# Patient Record
Sex: FEMALE | Race: WHITE | NOT HISPANIC OR LATINO | Employment: UNEMPLOYED | URBAN - METROPOLITAN AREA
[De-identification: names, ages, dates, MRNs, and addresses within clinical notes are randomized per-mention and may not be internally consistent; named-entity substitution may affect disease eponyms.]

---
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
## 2020-02-10 ENCOUNTER — HOSPITAL ENCOUNTER (EMERGENCY)
Facility: MEDICAL CENTER | Age: 20
End: 2020-02-10
Attending: EMERGENCY MEDICINE

## 2020-02-10 ENCOUNTER — APPOINTMENT (OUTPATIENT)
Dept: RADIOLOGY | Facility: MEDICAL CENTER | Age: 20
End: 2020-02-10
Attending: EMERGENCY MEDICINE

## 2020-02-10 VITALS
RESPIRATION RATE: 16 BRPM | HEIGHT: 63 IN | SYSTOLIC BLOOD PRESSURE: 104 MMHG | WEIGHT: 100.31 LBS | TEMPERATURE: 97.7 F | DIASTOLIC BLOOD PRESSURE: 72 MMHG | BODY MASS INDEX: 17.77 KG/M2 | HEART RATE: 76 BPM | OXYGEN SATURATION: 99 %

## 2020-02-10 DIAGNOSIS — N39.0 URINARY TRACT INFECTION WITH HEMATURIA, SITE UNSPECIFIED: ICD-10-CM

## 2020-02-10 DIAGNOSIS — R31.9 URINARY TRACT INFECTION WITH HEMATURIA, SITE UNSPECIFIED: ICD-10-CM

## 2020-02-10 DIAGNOSIS — O20.0 ABORTION, THREATENED: ICD-10-CM

## 2020-02-10 LAB
ALBUMIN SERPL BCP-MCNC: 5.1 G/DL (ref 3.2–4.9)
ALBUMIN/GLOB SERPL: 2.3 G/DL
ALP SERPL-CCNC: 64 U/L (ref 30–99)
ALT SERPL-CCNC: 8 U/L (ref 2–50)
ANION GAP SERPL CALC-SCNC: 10 MMOL/L (ref 0–11.9)
APPEARANCE UR: CLEAR
AST SERPL-CCNC: 15 U/L (ref 12–45)
B-HCG SERPL-ACNC: 87.6 MIU/ML (ref 0–5)
BACTERIA #/AREA URNS HPF: ABNORMAL /HPF
BASOPHILS # BLD AUTO: 0.6 % (ref 0–1.8)
BASOPHILS # BLD: 0.05 K/UL (ref 0–0.12)
BILIRUB SERPL-MCNC: 1 MG/DL (ref 0.1–1.5)
BILIRUB UR QL STRIP.AUTO: NEGATIVE
BUN SERPL-MCNC: 12 MG/DL (ref 8–22)
CALCIUM SERPL-MCNC: 9.8 MG/DL (ref 8.5–10.5)
CHLORIDE SERPL-SCNC: 105 MMOL/L (ref 96–112)
CO2 SERPL-SCNC: 23 MMOL/L (ref 20–33)
COLOR UR: YELLOW
CREAT SERPL-MCNC: 0.79 MG/DL (ref 0.5–1.4)
EOSINOPHIL # BLD AUTO: 0.09 K/UL (ref 0–0.51)
EOSINOPHIL NFR BLD: 1.1 % (ref 0–6.9)
EPI CELLS #/AREA URNS HPF: NEGATIVE /HPF
ERYTHROCYTE [DISTWIDTH] IN BLOOD BY AUTOMATED COUNT: 41.1 FL (ref 35.9–50)
GLOBULIN SER CALC-MCNC: 2.2 G/DL (ref 1.9–3.5)
GLUCOSE SERPL-MCNC: 80 MG/DL (ref 65–99)
GLUCOSE UR STRIP.AUTO-MCNC: NEGATIVE MG/DL
HCT VFR BLD AUTO: 40.2 % (ref 37–47)
HGB BLD-MCNC: 13.2 G/DL (ref 12–16)
HYALINE CASTS #/AREA URNS LPF: ABNORMAL /LPF
IMM GRANULOCYTES # BLD AUTO: 0.03 K/UL (ref 0–0.11)
IMM GRANULOCYTES NFR BLD AUTO: 0.4 % (ref 0–0.9)
KETONES UR STRIP.AUTO-MCNC: ABNORMAL MG/DL
LEUKOCYTE ESTERASE UR QL STRIP.AUTO: NEGATIVE
LIPASE SERPL-CCNC: 11 U/L (ref 11–82)
LYMPHOCYTES # BLD AUTO: 2.49 K/UL (ref 1–4.8)
LYMPHOCYTES NFR BLD: 29.5 % (ref 22–41)
MCH RBC QN AUTO: 29.5 PG (ref 27–33)
MCHC RBC AUTO-ENTMCNC: 32.8 G/DL (ref 33.6–35)
MCV RBC AUTO: 89.9 FL (ref 81.4–97.8)
MICRO URNS: ABNORMAL
MONOCYTES # BLD AUTO: 0.46 K/UL (ref 0–0.85)
MONOCYTES NFR BLD AUTO: 5.4 % (ref 0–13.4)
NEUTROPHILS # BLD AUTO: 5.33 K/UL (ref 2–7.15)
NEUTROPHILS NFR BLD: 63 % (ref 44–72)
NITRITE UR QL STRIP.AUTO: POSITIVE
NRBC # BLD AUTO: 0 K/UL
NRBC BLD-RTO: 0 /100 WBC
NUMBER OF RH DOSES IND 8505RD: 1
PH UR STRIP.AUTO: 8 [PH] (ref 5–8)
PLATELET # BLD AUTO: 171 K/UL (ref 164–446)
PMV BLD AUTO: 10.1 FL (ref 9–12.9)
POTASSIUM SERPL-SCNC: 3.6 MMOL/L (ref 3.6–5.5)
PROT SERPL-MCNC: 7.3 G/DL (ref 6–8.2)
PROT UR QL STRIP: NEGATIVE MG/DL
RBC # BLD AUTO: 4.47 M/UL (ref 4.2–5.4)
RBC # URNS HPF: ABNORMAL /HPF
RBC UR QL AUTO: ABNORMAL
RH BLD: NORMAL
SODIUM SERPL-SCNC: 138 MMOL/L (ref 135–145)
SP GR UR STRIP.AUTO: 1.01
UROBILINOGEN UR STRIP.AUTO-MCNC: 0.2 MG/DL
WBC # BLD AUTO: 8.5 K/UL (ref 4.8–10.8)
WBC #/AREA URNS HPF: ABNORMAL /HPF

## 2020-02-10 PROCEDURE — 81001 URINALYSIS AUTO W/SCOPE: CPT

## 2020-02-10 PROCEDURE — 85025 COMPLETE CBC W/AUTO DIFF WBC: CPT

## 2020-02-10 PROCEDURE — 84702 CHORIONIC GONADOTROPIN TEST: CPT

## 2020-02-10 PROCEDURE — 76801 OB US < 14 WKS SINGLE FETUS: CPT

## 2020-02-10 PROCEDURE — 86901 BLOOD TYPING SEROLOGIC RH(D): CPT

## 2020-02-10 PROCEDURE — 83690 ASSAY OF LIPASE: CPT

## 2020-02-10 PROCEDURE — 80053 COMPREHEN METABOLIC PANEL: CPT

## 2020-02-10 PROCEDURE — 99284 EMERGENCY DEPT VISIT MOD MDM: CPT

## 2020-02-10 RX ORDER — CEFDINIR 300 MG/1
300 CAPSULE ORAL 2 TIMES DAILY
Qty: 10 CAP | Refills: 0 | Status: SHIPPED | OUTPATIENT
Start: 2020-02-10 | End: 2020-02-15

## 2020-02-10 ASSESSMENT — LIFESTYLE VARIABLES: DO YOU DRINK ALCOHOL: NO

## 2020-02-10 NOTE — ED PROVIDER NOTES
ED Provider Note    Scribed for Reji Trujillo D.O. by Rock Fowler. 2/10/2020  3:55 PM    Primary care provider: Pcp Pt States None  Means of arrival: Walk in  History obtained from: Patient  History limited by: None    CHIEF COMPLAINT  Chief Complaint   Patient presents with   • Vaginal Bleeding     Spotting       HPI  Madyson Ocampo is a 19 y.o. female who is approximately 6 weeks pregnant and presents to the Emergency Department complaining of vaginal bleeding that started at 11am this morning. One week ago she took several at home pregnancy tests which were positive and then [facility]. This morning she noticed some blood on the tissue after wiping, then on arrival to the ED while using the bathroom noted blood dripping into the toilet bowl. Patient had mild abdominal cramping earlier which is now resolved. She endorses nausea, but no vomiting, no dysuria. Her last menstrual period was 12/31/19. She has extensive family history of endometriosis.    REVIEW OF SYSTEMS  Pertinent positives include vaginal bleeding, abdominal cramping (now resolved). Pertinent negatives include no vomiting, no dysuria.  All other systems reviewed and negative.    PAST MEDICAL HISTORY  History reviewed. No pertinent past medical history.    SURGICAL HISTORY  History reviewed. No pertinent surgical history.     SOCIAL HISTORY  Social History     Tobacco Use   • Smoking status: Never Smoker   • Smokeless tobacco: Never Used   Substance Use Topics   • Alcohol use: No   • Drug use: No      Social History     Substance and Sexual Activity   Drug Use No       FAMILY HISTORY  No family history on file.    CURRENT MEDICATIONS  Home Medications     Reviewed by Schuyler B Collett, R.N. (Registered Nurse) on 02/10/20 at 1449  Med List Status: <None>   Medication Last Dose Status        Patient Heladio Taking any Medications                       ALLERGIES  No Known  "Allergies    PHYSICAL EXAM  VITAL SIGNS: /72   Pulse 76   Temp 36.5 °C (97.7 °F) (Temporal)   Resp 16   Ht 1.6 m (5' 3\")   Wt 45.5 kg (100 lb 5 oz)   LMP 12/31/2019   SpO2 99%   BMI 17.77 kg/m²     Nursing notes and vitals reviewed.  Constitutional: Well developed, Well nourished, No acute distress, Non-toxic appearance.   Eyes: PERRLA, EOMI, Conjunctiva normal, No discharge.   Cardiovascular: Normal heart rate, Normal rhythm, No murmurs, No rubs, No gallops.   Thorax & Lungs: No respiratory distress, No rales, No rhonchi, No wheezing, No chest tenderness.   Abdomen: Bowel sounds normal, Soft, No tenderness, No guarding, No rebound, No masses, No pulsatile masses.   Skin: Warm, Dry, No erythema, No rash.   Musculoskeletal: Intact distal pulses, No edema, No cyanosis, No clubbing. Good range of motion in all major joints. No tenderness to palpation or major deformities noted, no CVA tenderness, no midline back tenderness.   Neurologic: Alert & oriented x 3, Normal motor function, Normal sensory function, No focal deficits noted.  Psychiatric: Affect normal for clinical presentation.    DIAGNOSTIC STUDIES/PROCEDURES    LABS  Results for orders placed or performed during the hospital encounter of 02/10/20   CBC WITH DIFFERENTIAL   Result Value Ref Range    WBC 8.5 4.8 - 10.8 K/uL    RBC 4.47 4.20 - 5.40 M/uL    Hemoglobin 13.2 12.0 - 16.0 g/dL    Hematocrit 40.2 37.0 - 47.0 %    MCV 89.9 81.4 - 97.8 fL    MCH 29.5 27.0 - 33.0 pg    MCHC 32.8 (L) 33.6 - 35.0 g/dL    RDW 41.1 35.9 - 50.0 fL    Platelet Count 171 164 - 446 K/uL    MPV 10.1 9.0 - 12.9 fL    Neutrophils-Polys 63.00 44.00 - 72.00 %    Lymphocytes 29.50 22.00 - 41.00 %    Monocytes 5.40 0.00 - 13.40 %    Eosinophils 1.10 0.00 - 6.90 %    Basophils 0.60 0.00 - 1.80 %    Immature Granulocytes 0.40 0.00 - 0.90 %    Nucleated RBC 0.00 /100 WBC    Neutrophils (Absolute) 5.33 2.00 - 7.15 K/uL    Lymphs (Absolute) 2.49 1.00 - 4.80 K/uL    Monos " (Absolute) 0.46 0.00 - 0.85 K/uL    Eos (Absolute) 0.09 0.00 - 0.51 K/uL    Baso (Absolute) 0.05 0.00 - 0.12 K/uL    Immature Granulocytes (abs) 0.03 0.00 - 0.11 K/uL    NRBC (Absolute) 0.00 K/uL   COMP METABOLIC PANEL   Result Value Ref Range    Sodium 138 135 - 145 mmol/L    Potassium 3.6 3.6 - 5.5 mmol/L    Chloride 105 96 - 112 mmol/L    Co2 23 20 - 33 mmol/L    Anion Gap 10.0 0.0 - 11.9    Glucose 80 65 - 99 mg/dL    Bun 12 8 - 22 mg/dL    Creatinine 0.79 0.50 - 1.40 mg/dL    Calcium 9.8 8.5 - 10.5 mg/dL    AST(SGOT) 15 12 - 45 U/L    ALT(SGPT) 8 2 - 50 U/L    Alkaline Phosphatase 64 30 - 99 U/L    Total Bilirubin 1.0 0.1 - 1.5 mg/dL    Albumin 5.1 (H) 3.2 - 4.9 g/dL    Total Protein 7.3 6.0 - 8.2 g/dL    Globulin 2.2 1.9 - 3.5 g/dL    A-G Ratio 2.3 g/dL   LIPASE   Result Value Ref Range    Lipase 11 11 - 82 U/L   HCG QUANTITATIVE   Result Value Ref Range    Bhcg 87.6 (H) 0.0 - 5.0 mIU/mL   URINALYSIS,CULTURE IF INDICATED   Result Value Ref Range    Color Yellow     Character Clear     Specific Gravity 1.015 <1.035    Ph 8.0 5.0 - 8.0    Glucose Negative Negative mg/dL    Ketones Trace (A) Negative mg/dL    Protein Negative Negative mg/dL    Bilirubin Negative Negative    Urobilinogen, Urine 0.2 Negative    Nitrite Positive (A) Negative    Leukocyte Esterase Negative Negative    Occult Blood Moderate (A) Negative    Micro Urine Req Microscopic    RH TYPE FOR RHOGAM FROM E.D.   Result Value Ref Range    Emergency Department Rh Typing NEG     Number Of Rh Doses Indicated 1    ESTIMATED GFR   Result Value Ref Range    GFR If African American >60 >60 mL/min/1.73 m 2    GFR If Non African American >60 >60 mL/min/1.73 m 2   URINE MICROSCOPIC (W/UA)   Result Value Ref Range    WBC 0-2 /hpf    RBC 2-5 (A) /hpf    Bacteria Many (A) None /hpf    Epithelial Cells Negative /hpf    Hyaline Cast 0-2 /lpf       All labs reviewed by me.    RADIOLOGY  US-OB 1ST TRIMESTER WITH TRANSVAGINAL (COMBO)   Final Result      1.  No  intrauterine pregnancy seen.  If the patient does have a positive pregnancy test, differential includes early pregnancy, complete , and ectopic pregnancy.  Correlation with quantitative beta-hCG and follow-up ultrasound is recommended.           The radiologist's interpretation of all radiological studies have been reviewed by me.    COURSE & MEDICAL DECISION MAKING  Pertinent Labs & Imaging studies reviewed. (See chart for details)    3:55 PM - Patient seen and examined at bedside. 6w pregnant female presents with vaginal bleeding. Her vitals and exam are reassuring. Ordered US OB, estimated GFR, Rh, CBC, CMP, lipase HCG quant, UA to evaluate her symptoms.     This is a charming 19 y.o. female that presents with threatened .  The patient does have a RANDY of 87.6 and with her last menstrual period being on 2018 was probably experiencing a miscarriage.  I cannot completely exclude an early pregnancy or ectopic pregnancy.  For this reason, ultrasound was completed revealed no evidence of intrauterine pregnancy or adnexal mass or axil hemorrhage.  The patient was given an outpatient lab order slip for beta-hCG in 2 days and she will be following up with [facility] pregnancy [place] in 2 days as well.  In addition she has evidence of urinary tract infection and she received prescription for cefdinir.  Should return precautions have been given profound vaginal bleeding, nausea, vomiting.    FINAL IMPRESSION  1. , threatened Active   2. Urinary tract infection with hematuria, site unspecified          Rock MIKE (Scribe), am scribing for, and in the presence of, Reji Trujillo D.O    Electronically signed by: Rock Fowler (Giselle), 2/10/2020    IReji D.O. personally performed the services described in this documentation, as scribed by Rock Fowler in my presence, and it is both accurate and complete. C.    The note accurately reflects work and decisions  made by me.  Reji Trujillo D.O.  2/10/2020  5:19 PM

## 2020-02-10 NOTE — ED TRIAGE NOTES
Madyson Ocampo presents to triage reporting light pink vaginal spotting since this AM. Pt reports she is 6 weeks pregnant, . Pt reports occasional cramping.     Pt speaking in full sentences, NAD noted. Pt educated of triage process, placed back in waiting area pending room assignment.

## 2020-02-11 NOTE — DISCHARGE INSTRUCTIONS
Please go to the lab in 2 days with your lab order sheet for beta-hCG.  With these results, go to your OB gynecology appointment in 2 to 3 days.  Return to the emergency department if you have increasing pain, nausea, vomiting.

## 2020-02-11 NOTE — ED NOTES
RN educated Pt on follow-up lab draw, antibiotic, and return precautions. Pt verbalized understanding prior to departure.

## 2020-02-12 ENCOUNTER — HOSPITAL ENCOUNTER (OUTPATIENT)
Facility: MEDICAL CENTER | Age: 20
End: 2020-02-12
Attending: EMERGENCY MEDICINE

## 2020-02-12 PROCEDURE — 84702 CHORIONIC GONADOTROPIN TEST: CPT

## 2020-02-13 LAB — B-HCG SERPL-ACNC: 27.6 MIU/ML (ref 0–5)

## 2021-02-05 ENCOUNTER — OFFICE VISIT (OUTPATIENT)
Dept: URGENT CARE | Facility: CLINIC | Age: 21
End: 2021-02-05
Payer: COMMERCIAL

## 2021-02-05 ENCOUNTER — HOSPITAL ENCOUNTER (OUTPATIENT)
Facility: MEDICAL CENTER | Age: 21
End: 2021-02-05
Attending: NURSE PRACTITIONER
Payer: COMMERCIAL

## 2021-02-05 VITALS
SYSTOLIC BLOOD PRESSURE: 102 MMHG | OXYGEN SATURATION: 98 % | WEIGHT: 82 LBS | HEART RATE: 92 BPM | HEIGHT: 63 IN | BODY MASS INDEX: 14.53 KG/M2 | RESPIRATION RATE: 16 BRPM | TEMPERATURE: 98.6 F | DIASTOLIC BLOOD PRESSURE: 72 MMHG

## 2021-02-05 DIAGNOSIS — N30.01 ACUTE CYSTITIS WITH HEMATURIA: ICD-10-CM

## 2021-02-05 LAB
APPEARANCE UR: NORMAL
BILIRUB UR STRIP-MCNC: NEGATIVE MG/DL
COLOR UR AUTO: YELLOW
GLUCOSE UR STRIP.AUTO-MCNC: NEGATIVE MG/DL
INT CON NEG: NEGATIVE
INT CON POS: POSITIVE
KETONES UR STRIP.AUTO-MCNC: NEGATIVE MG/DL
LEUKOCYTE ESTERASE UR QL STRIP.AUTO: NEGATIVE
NITRITE UR QL STRIP.AUTO: NEGATIVE
PH UR STRIP.AUTO: 5.5 [PH] (ref 5–8)
POC URINE PREGNANCY TEST: NEGATIVE
PROT UR QL STRIP: NORMAL MG/DL
RBC UR QL AUTO: NORMAL
SP GR UR STRIP.AUTO: 1.02
UROBILINOGEN UR STRIP-MCNC: 0.2 MG/DL

## 2021-02-05 PROCEDURE — 81002 URINALYSIS NONAUTO W/O SCOPE: CPT | Performed by: NURSE PRACTITIONER

## 2021-02-05 PROCEDURE — 87086 URINE CULTURE/COLONY COUNT: CPT

## 2021-02-05 PROCEDURE — 99214 OFFICE O/P EST MOD 30 MIN: CPT | Performed by: NURSE PRACTITIONER

## 2021-02-05 PROCEDURE — 81025 URINE PREGNANCY TEST: CPT | Performed by: NURSE PRACTITIONER

## 2021-02-05 RX ORDER — NITROFURANTOIN 25; 75 MG/1; MG/1
100 CAPSULE ORAL 2 TIMES DAILY
Qty: 10 CAP | Refills: 0 | Status: SHIPPED | OUTPATIENT
Start: 2021-02-05 | End: 2021-02-05

## 2021-02-05 RX ORDER — HYDRALAZINE HYDROCHLORIDE 25 MG/1
25 TABLET, FILM COATED ORAL 3 TIMES DAILY
Status: ON HOLD | COMMUNITY
End: 2021-11-05

## 2021-02-05 RX ORDER — CIPROFLOXACIN 500 MG/1
500 TABLET, FILM COATED ORAL 2 TIMES DAILY
Qty: 10 TAB | Refills: 0 | Status: SHIPPED | OUTPATIENT
Start: 2021-02-05 | End: 2021-02-10

## 2021-02-05 RX ORDER — ARIPIPRAZOLE 10 MG/1
10 TABLET ORAL DAILY
Status: ON HOLD | COMMUNITY
End: 2021-11-05

## 2021-02-05 ASSESSMENT — ENCOUNTER SYMPTOMS
CHILLS: 0
SORE THROAT: 0
NAUSEA: 0
SHORTNESS OF BREATH: 0
EYE PAIN: 0
BACK PAIN: 1
DIZZINESS: 0
FLANK PAIN: 0
MYALGIAS: 0
FEVER: 0
VOMITING: 0

## 2021-02-05 ASSESSMENT — FIBROSIS 4 INDEX: FIB4 SCORE: 0.62

## 2021-02-06 NOTE — PROGRESS NOTES
Subjective:   Madyson Ocampo is a 20 y.o. female who presents for UTI (burning around ribs, thinks she may have kidney infection, x2 weeks ) and Sexually Transmitted Diseases (wants testing )      UTI  This is a new problem. Episode onset: 2 weeks. The problem occurs constantly. The problem has been gradually worsening. Pertinent negatives include no chest pain, chills, fever, myalgias, nausea, rash, sore throat or vomiting. Nothing aggravates the symptoms. Treatments tried: Macrobid x5 days. The treatment provided no relief.   Patient denies exposure to STD however has been with her current boyfriend for the past 3 months and has never been tested.  States that her boyfriend does have small bumps on his penis.  She is asymptomatic this time.    Review of Systems   Constitutional: Negative for chills and fever.   HENT: Negative for sore throat.    Eyes: Negative for pain.   Respiratory: Negative for shortness of breath.    Cardiovascular: Negative for chest pain.   Gastrointestinal: Negative for nausea and vomiting.   Genitourinary: Positive for dysuria and urgency. Negative for flank pain, frequency and hematuria.   Musculoskeletal: Positive for back pain. Negative for myalgias.   Skin: Negative for rash.   Neurological: Negative for dizziness.       Medications:    • ARIPiprazole Tabs  • hydrALAZINE Tabs  • nitrofurantoin Caps    Allergies: Patient has no known allergies.    Problem List: Madyson Ocampo does not have a problem list on file.    Surgical History:  No past surgical history on file.    Past Social Hx: Madyson Ocampo  reports that she has never smoked. She has never used smokeless tobacco. She reports that she does not drink alcohol or use drugs.     Past Family Hx:  Madyson Ocampo family history is not on file.     Problem list, medications, and allergies reviewed by myself today in Epic.     Objective:     /72 (BP Location: Right arm, Patient Position: Sitting, BP Cuff Size: Adult long)  "  Pulse 92   Temp 37 °C (98.6 °F) (Temporal)   Resp 16   Ht 1.6 m (5' 3\")   Wt 37.2 kg (82 lb)   SpO2 98%   BMI 14.53 kg/m²     Physical Exam  Vitals signs and nursing note reviewed.   Constitutional:       General: She is not in acute distress.     Appearance: She is well-developed.   HENT:      Head: Normocephalic and atraumatic.      Right Ear: External ear normal.      Left Ear: External ear normal.      Nose: Nose normal.      Mouth/Throat:      Mouth: Mucous membranes are moist.   Eyes:      Conjunctiva/sclera: Conjunctivae normal.   Cardiovascular:      Rate and Rhythm: Normal rate.   Pulmonary:      Effort: Pulmonary effort is normal. No respiratory distress.      Breath sounds: Normal breath sounds.   Abdominal:      General: Bowel sounds are normal. There is no distension.      Tenderness: There is no abdominal tenderness. There is no right CVA tenderness or left CVA tenderness.   Genitourinary:     Comments: Deferred exam   Musculoskeletal: Normal range of motion.   Skin:     General: Skin is warm and dry.   Neurological:      General: No focal deficit present.      Mental Status: She is alert and oriented to person, place, and time. Mental status is at baseline.      Gait: Gait (gait at baseline) normal.   Psychiatric:         Judgment: Judgment normal.         Assessment/Plan:     Diagnosis and associated orders:     1. Acute cystitis with hematuria  POCT Urinalysis    URINE CULTURE(NEW)    POCT Pregnancy    ciprofloxacin (CIPRO) 500 MG Tab    DISCONTINUED: nitrofurantoin (MACROBID) 100 MG Cap        Comments/MDM:     UA positive; RBCs, leukocytes  hCG negative    Patient is a nontoxic-appearing 20-year-old female present with the stated above, patient's vital signs stable, patient without a fever, no CVA tenderness was elicited on exam, she will be started on antibiotic therapy.  Patient has taken a 5-day course of Macrobid with no improvement of symptoms.  Patient adamantly requesting to be " treated with ciprofloxacin as this has been the most effective in the past.  Did discuss the potential side effects of this medication.  She has tolerated in the past.  Patient will be started on ciprofloxacin..  Will send urine for culture and will follow up with pending results and change treatment if necessary.  Initially patient did wish to be screened and tested for STds however she has had no known exposure and is asymptomatic she [facility] go to [facility] to have a full STD panel complete.  Differential diagnosis, natural history, supportive care, and indications for immediate follow-up discussed. Advised the patient to follow-up with the primary care physician for recheck, reevaluation, and consideration of further management.               Please note that this dictation was created using voice recognition software. I have made a reasonable attempt to correct obvious errors, but I expect that there are errors of grammar and possibly content that I did not discover before finalizing the note.    This note was electronically signed by Cesar PARRA.

## 2021-02-08 LAB
BACTERIA UR CULT: NORMAL
SIGNIFICANT IND 70042: NORMAL
SITE SITE: NORMAL
SOURCE SOURCE: NORMAL

## 2021-04-10 ENCOUNTER — HOSPITAL ENCOUNTER (OUTPATIENT)
Facility: MEDICAL CENTER | Age: 21
End: 2021-04-10
Attending: FAMILY MEDICINE
Payer: COMMERCIAL

## 2021-04-10 ENCOUNTER — OFFICE VISIT (OUTPATIENT)
Dept: URGENT CARE | Facility: PHYSICIAN GROUP | Age: 21
End: 2021-04-10
Payer: COMMERCIAL

## 2021-04-10 VITALS
SYSTOLIC BLOOD PRESSURE: 100 MMHG | RESPIRATION RATE: 18 BRPM | DIASTOLIC BLOOD PRESSURE: 62 MMHG | BODY MASS INDEX: 17.54 KG/M2 | WEIGHT: 99 LBS | HEIGHT: 63 IN | TEMPERATURE: 98.7 F | OXYGEN SATURATION: 99 % | HEART RATE: 98 BPM

## 2021-04-10 DIAGNOSIS — O23.41 URINARY TRACT INFECTION IN MOTHER DURING FIRST TRIMESTER OF PREGNANCY: ICD-10-CM

## 2021-04-10 DIAGNOSIS — O21.9 NAUSEA AND VOMITING DURING PREGNANCY: ICD-10-CM

## 2021-04-10 LAB
APPEARANCE UR: NORMAL
BILIRUB UR STRIP-MCNC: NORMAL MG/DL
COLOR UR AUTO: NORMAL
GLUCOSE UR STRIP.AUTO-MCNC: NORMAL MG/DL
KETONES UR STRIP.AUTO-MCNC: NORMAL MG/DL
LEUKOCYTE ESTERASE UR QL STRIP.AUTO: NORMAL
NITRITE UR QL STRIP.AUTO: NORMAL
PH UR STRIP.AUTO: 6.5 [PH] (ref 5–8)
PROT UR QL STRIP: NORMAL MG/DL
RBC UR QL AUTO: NORMAL
SP GR UR STRIP.AUTO: 1.02
UROBILINOGEN UR STRIP-MCNC: 0.2 MG/DL

## 2021-04-10 PROCEDURE — 87086 URINE CULTURE/COLONY COUNT: CPT

## 2021-04-10 PROCEDURE — 99215 OFFICE O/P EST HI 40 MIN: CPT | Performed by: FAMILY MEDICINE

## 2021-04-10 PROCEDURE — 87186 SC STD MICRODIL/AGAR DIL: CPT

## 2021-04-10 PROCEDURE — 87491 CHLMYD TRACH DNA AMP PROBE: CPT

## 2021-04-10 PROCEDURE — 87077 CULTURE AEROBIC IDENTIFY: CPT

## 2021-04-10 PROCEDURE — 81002 URINALYSIS NONAUTO W/O SCOPE: CPT | Performed by: FAMILY MEDICINE

## 2021-04-10 PROCEDURE — 87591 N.GONORRHOEAE DNA AMP PROB: CPT

## 2021-04-10 RX ORDER — ONDANSETRON 4 MG/1
4 TABLET, ORALLY DISINTEGRATING ORAL EVERY 8 HOURS PRN
Qty: 10 TABLET | Refills: 0 | Status: SHIPPED | OUTPATIENT
Start: 2021-04-10 | End: 2021-04-10 | Stop reason: CLARIF

## 2021-04-10 RX ORDER — NITROFURANTOIN 25; 75 MG/1; MG/1
100 CAPSULE ORAL 2 TIMES DAILY
Qty: 14 CAPSULE | Refills: 0 | Status: SHIPPED | OUTPATIENT
Start: 2021-04-10 | End: 2021-04-17

## 2021-04-10 RX ORDER — ONDANSETRON 4 MG/1
4 TABLET, ORALLY DISINTEGRATING ORAL EVERY 8 HOURS PRN
Qty: 10 TABLET | Refills: 0 | Status: ON HOLD
Start: 2021-04-10 | End: 2021-11-07

## 2021-04-10 ASSESSMENT — FIBROSIS 4 INDEX: FIB4 SCORE: 0.62

## 2021-04-10 NOTE — PROGRESS NOTES
"  Chief Complaint   Patient presents with   • Emesis     7.5 weeks pregnant, had first prenatal care a week ago. she is vomitng and not keeping anything down. was seen in ER for this as well. she wakes up in the middle of the night. ER gave her zofran and it seemed to help. also having some pain and urgerncy, history of UTI          HPI:        #1. N/v in pregnancy:    She   presents with nausea and vomiting throughout the duration of her pregnancy.    States that it is slightly worse than is typical for her.      She has actually been able to keep down liquids, but has low appetite -  not eating much.   She denies wt loss, in fact states she has been gaining wt.     The vomiting is intermittent - states that she can go 1-2 days without n/v and other days the n/v is all day.         She is: .    She reports that she is 8 weeks today, via first trimester u/s.   However, she reports LMP as , which would put her at 10w6d, today.                  #2.  Dysuria - C/o strong smelling and dark urine with some dysuria x 3 wks.    Denies fever or kidney pain.   Denies abd pain.      Reports clear vaginal discharge, but she was evaluated by gyn and told that this was normal at this stage of pregnancy.    She denies any vaginal bleeding or hematuria.          History reviewed. No pertinent past medical history.    Social History     Tobacco Use   • Smoking status: Never Smoker   • Smokeless tobacco: Never Used   Substance Use Topics   • Alcohol use: No   • Drug use: No                  Review of Systems   Constitutional: Negative for fever, chills and weight loss.   Respiratory: Negative for cough and wheezing.    Cardiovascular: Negative for chest pain.   Gastrointestinal:   Negative for  blood in stool.   Neurological: Negative for dizziness and headaches.   All other systems reviewed and are negative.         Objective:     /62   Pulse 98   Temp 37.1 °C (98.7 °F) (Temporal)   Resp 18   Ht 1.6 m (5' 3\")  "  Wt 44.9 kg (99 lb)   SpO2 99%       Physical Exam   Constitutional: pt is oriented to person, place, and time and appears well-developed. No distress.   HENT:   Head: Normocephalic and atraumatic.   Mouth/Throat: No oropharyngeal exudate.  mucous membranes moist  Eyes: Conjunctivae are normal. No scleral icterus.   Cardiovascular: Normal rate    Pulmonary/Chest: Effort normal    Abdominal: Normal appearance.  No flank pain.   No distension.       Pt has no cervical adenopathy.   Neurological: pt is alert and oriented to person, place, and time.   Skin: Skin is warm. Pt is not diaphoretic. No erythema.   Psychiatric:  behavior is normal.   Nursing note and vitals reviewed.         Lab Results   Component Value Date/Time    POCCOLOR ryan 04/10/2021 03:09 PM    POCAPPEAR cloudy 04/10/2021 03:09 PM    POCLEUKEST neg 04/10/2021 03:09 PM    POCNITRITE pos 04/10/2021 03:09 PM    POCUROBILIGE 0.2 04/10/2021 03:09 PM    POCPROTEIN neg 04/10/2021 03:09 PM    POCURPH 6.5 04/10/2021 03:09 PM    POCBLOOD neg 04/10/2021 03:09 PM    POCSPGRV 1.025 04/10/2021 03:09 PM    POCKETONES neg 04/10/2021 03:09 PM    POCBILIRUBIN neg 04/10/2021 03:09 PM    POCGLUCUA neg 04/10/2021 03:09 PM         Assessment/Plan:        1. Nausea and vomiting during pregnancy     She is able to tolerate PO fluids    Recommend Diclegis, but pt refused secondary to cost    Will prescribe zofran per pt request.       - ondansetron (ZOFRAN ODT) 4 MG TABLET DISPERSIBLE; Take 1 tablet by mouth every 8 hours as needed.  Dispense: 10 tablet; Refill: 0    Follow up in one week if no improvement, sooner if symptoms worsen.         2. Urinary tract infection in mother during first trimester of pregnancy  UA shows positive nitrites, negative for blood    This likely is UTI - will start on macrobid  Urine sent for culture    - nitrofurantoin (MACROBID) 100 MG Cap; Take 1 capsule by mouth 2 times a day for 7 days.  Dispense: 14 capsule; Refill: 0        - URINE  CULTURE(NEW); Future  - Chlamydia/GC PCR Urine Or Swab; Future         Follow up in one week if no improvement, sooner if symptoms worsen.         My total time spent caring for the patient on the day of the encounter was  40  minutes.   This does not include time spent on separately billable procedures/tests.

## 2021-04-11 LAB
C TRACH DNA SPEC QL NAA+PROBE: NEGATIVE
N GONORRHOEA DNA SPEC QL NAA+PROBE: NEGATIVE
SPECIMEN SOURCE: NORMAL

## 2021-04-13 LAB
BACTERIA UR CULT: ABNORMAL
BACTERIA UR CULT: ABNORMAL
SIGNIFICANT IND 70042: ABNORMAL
SITE SITE: ABNORMAL
SOURCE SOURCE: ABNORMAL

## 2021-04-28 LAB
ABO GROUP BLD: NORMAL
HIV 1+2 AB+HIV1 P24 AG SERPL QL IA: NORMAL
RH BLD: NEGATIVE
RUBV IGG SERPL IA-ACNC: 1.24
TREPONEMA PALLIDUM IGG+IGM AB [PRESENCE] IN SERUM OR PLASMA BY IMMUNOASSAY: NORMAL

## 2021-06-21 ENCOUNTER — APPOINTMENT (OUTPATIENT)
Dept: RADIOLOGY | Facility: MEDICAL CENTER | Age: 21
End: 2021-06-21
Attending: EMERGENCY MEDICINE
Payer: COMMERCIAL

## 2021-06-21 ENCOUNTER — HOSPITAL ENCOUNTER (EMERGENCY)
Facility: MEDICAL CENTER | Age: 21
End: 2021-06-21
Attending: EMERGENCY MEDICINE
Payer: COMMERCIAL

## 2021-06-21 VITALS
SYSTOLIC BLOOD PRESSURE: 107 MMHG | HEIGHT: 63 IN | HEART RATE: 68 BPM | BODY MASS INDEX: 17.36 KG/M2 | OXYGEN SATURATION: 97 % | TEMPERATURE: 98 F | DIASTOLIC BLOOD PRESSURE: 56 MMHG | RESPIRATION RATE: 16 BRPM | WEIGHT: 98 LBS

## 2021-06-21 DIAGNOSIS — O21.0 HYPEREMESIS GRAVIDARUM: ICD-10-CM

## 2021-06-21 LAB
ALBUMIN SERPL BCP-MCNC: 3.8 G/DL (ref 3.2–4.9)
ALBUMIN/GLOB SERPL: 1.5 G/DL
ALP SERPL-CCNC: 58 U/L (ref 30–99)
ALT SERPL-CCNC: 12 U/L (ref 2–50)
ANION GAP SERPL CALC-SCNC: 11 MMOL/L (ref 7–16)
APPEARANCE UR: ABNORMAL
AST SERPL-CCNC: 22 U/L (ref 12–45)
B-HCG SERPL-ACNC: ABNORMAL MIU/ML (ref 0–5)
BACTERIA #/AREA URNS HPF: ABNORMAL /HPF
BASOPHILS # BLD AUTO: 0.3 % (ref 0–1.8)
BASOPHILS # BLD: 0.02 K/UL (ref 0–0.12)
BILIRUB SERPL-MCNC: 0.4 MG/DL (ref 0.1–1.5)
BILIRUB UR QL STRIP.AUTO: NEGATIVE
BUN SERPL-MCNC: 7 MG/DL (ref 8–22)
CALCIUM SERPL-MCNC: 9.1 MG/DL (ref 8.5–10.5)
CHLORIDE SERPL-SCNC: 101 MMOL/L (ref 96–112)
CO2 SERPL-SCNC: 25 MMOL/L (ref 20–33)
COLOR UR: YELLOW
CREAT SERPL-MCNC: 0.55 MG/DL (ref 0.5–1.4)
EOSINOPHIL # BLD AUTO: 0.03 K/UL (ref 0–0.51)
EOSINOPHIL NFR BLD: 0.4 % (ref 0–6.9)
EPI CELLS #/AREA URNS HPF: ABNORMAL /HPF
ERYTHROCYTE [DISTWIDTH] IN BLOOD BY AUTOMATED COUNT: 47.9 FL (ref 35.9–50)
GLOBULIN SER CALC-MCNC: 2.5 G/DL (ref 1.9–3.5)
GLUCOSE SERPL-MCNC: 75 MG/DL (ref 65–99)
GLUCOSE UR STRIP.AUTO-MCNC: NEGATIVE MG/DL
HCT VFR BLD AUTO: 31.6 % (ref 37–47)
HGB BLD-MCNC: 10.1 G/DL (ref 12–16)
HYALINE CASTS #/AREA URNS LPF: ABNORMAL /LPF
IMM GRANULOCYTES # BLD AUTO: 0.02 K/UL (ref 0–0.11)
IMM GRANULOCYTES NFR BLD AUTO: 0.3 % (ref 0–0.9)
KETONES UR STRIP.AUTO-MCNC: NEGATIVE MG/DL
LEUKOCYTE ESTERASE UR QL STRIP.AUTO: ABNORMAL
LIPASE SERPL-CCNC: 39 U/L (ref 11–82)
LYMPHOCYTES # BLD AUTO: 1.97 K/UL (ref 1–4.8)
LYMPHOCYTES NFR BLD: 29.4 % (ref 22–41)
MCH RBC QN AUTO: 27.2 PG (ref 27–33)
MCHC RBC AUTO-ENTMCNC: 32 G/DL (ref 33.6–35)
MCV RBC AUTO: 84.9 FL (ref 81.4–97.8)
MICRO URNS: ABNORMAL
MONOCYTES # BLD AUTO: 0.32 K/UL (ref 0–0.85)
MONOCYTES NFR BLD AUTO: 4.8 % (ref 0–13.4)
NEUTROPHILS # BLD AUTO: 4.34 K/UL (ref 2–7.15)
NEUTROPHILS NFR BLD: 64.8 % (ref 44–72)
NITRITE UR QL STRIP.AUTO: NEGATIVE
NRBC # BLD AUTO: 0 K/UL
NRBC BLD-RTO: 0 /100 WBC
PH UR STRIP.AUTO: 7 [PH] (ref 5–8)
PLATELET # BLD AUTO: 154 K/UL (ref 164–446)
PMV BLD AUTO: 10.5 FL (ref 9–12.9)
POTASSIUM SERPL-SCNC: 3.3 MMOL/L (ref 3.6–5.5)
PROT SERPL-MCNC: 6.3 G/DL (ref 6–8.2)
PROT UR QL STRIP: NEGATIVE MG/DL
RBC # BLD AUTO: 3.72 M/UL (ref 4.2–5.4)
RBC # URNS HPF: ABNORMAL /HPF
RBC UR QL AUTO: NEGATIVE
SODIUM SERPL-SCNC: 137 MMOL/L (ref 135–145)
SP GR UR STRIP.AUTO: 1.02
UROBILINOGEN UR STRIP.AUTO-MCNC: 1 MG/DL
WBC # BLD AUTO: 6.7 K/UL (ref 4.8–10.8)
WBC #/AREA URNS HPF: ABNORMAL /HPF

## 2021-06-21 PROCEDURE — 99284 EMERGENCY DEPT VISIT MOD MDM: CPT

## 2021-06-21 PROCEDURE — 81001 URINALYSIS AUTO W/SCOPE: CPT

## 2021-06-21 PROCEDURE — 83690 ASSAY OF LIPASE: CPT

## 2021-06-21 PROCEDURE — 700102 HCHG RX REV CODE 250 W/ 637 OVERRIDE(OP): Performed by: EMERGENCY MEDICINE

## 2021-06-21 PROCEDURE — 76815 OB US LIMITED FETUS(S): CPT

## 2021-06-21 PROCEDURE — 84702 CHORIONIC GONADOTROPIN TEST: CPT

## 2021-06-21 PROCEDURE — A9270 NON-COVERED ITEM OR SERVICE: HCPCS | Performed by: EMERGENCY MEDICINE

## 2021-06-21 PROCEDURE — 36415 COLL VENOUS BLD VENIPUNCTURE: CPT

## 2021-06-21 PROCEDURE — 85025 COMPLETE CBC W/AUTO DIFF WBC: CPT

## 2021-06-21 PROCEDURE — 80053 COMPREHEN METABOLIC PANEL: CPT

## 2021-06-21 RX ORDER — SODIUM CHLORIDE 9 MG/ML
1000 INJECTION, SOLUTION INTRAVENOUS ONCE
Status: DISCONTINUED | OUTPATIENT
Start: 2021-06-21 | End: 2021-06-22 | Stop reason: HOSPADM

## 2021-06-21 RX ORDER — CEPHALEXIN 500 MG/1
500 CAPSULE ORAL ONCE
Status: COMPLETED | OUTPATIENT
Start: 2021-06-21 | End: 2021-06-21

## 2021-06-21 RX ORDER — CEPHALEXIN 500 MG/1
500 CAPSULE ORAL 4 TIMES DAILY
Qty: 40 CAPSULE | Refills: 0 | Status: ON HOLD | OUTPATIENT
Start: 2021-06-21 | End: 2021-11-05

## 2021-06-21 RX ADMIN — CEPHALEXIN 500 MG: 500 CAPSULE ORAL at 21:47

## 2021-06-21 ASSESSMENT — FIBROSIS 4 INDEX: FIB4 SCORE: 0.62

## 2021-06-21 ASSESSMENT — LIFESTYLE VARIABLES: DO YOU DRINK ALCOHOL: NO

## 2021-06-21 NOTE — ED TRIAGE NOTES
18 weeks 5 days pregnant, c/o vomiting throughout pregnancy, has only gained 1-2 lbs. Also c/o dysuria x 2 weeks

## 2021-06-22 NOTE — ED PROVIDER NOTES
"ED Provider Note    CHIEF COMPLAINT  Chief Complaint   Patient presents with   • Painful Urination       HPI  Madyson Reny Ocampo is a 20 y.o. female here for evaluation of dysuria, and blood-tinged sputum.  Patient is currently 18 weeks pregnant, and has a normal pregnancy per her OB/GYN doctor Quynh.  Patient has hyperemesis gravidarum, and takes Zofran for the same.  She states that if she starts to dry heave too often and too much, she will get some blood-tinged sputum.  She denies any chest pain or shortness of breath, she denies any clotting of blood, she denies any hematemesis.  Patient has no abdominal pain, and no vaginal bleeding.  She does however have cloudy urine and some urinary frequency.  She states she feels this way when she has urinary tract infections.    ROS;  Please see HPI  O/W negative     PAST MEDICAL HISTORY   No bleeding disorders    SOCIAL HISTORY  Social History     Tobacco Use   • Smoking status: Never Smoker   • Smokeless tobacco: Never Used   Vaping Use   • Vaping Use: Never used   Substance and Sexual Activity   • Alcohol use: No   • Drug use: No   • Sexual activity: Yes     Partners: Male       SURGICAL HISTORY  patient denies any surgical history    CURRENT MEDICATIONS  Home Medications    **Home medications have not yet been reviewed for this encounter**         ALLERGIES  No Known Allergies    REVIEW OF SYSTEMS  See HPI for further details. Review of systems as above, otherwise all other systems are negative.     PHYSICAL EXAM  VITAL SIGNS: /56   Pulse 69   Temp 36.7 °C (98 °F) (Temporal)   Resp 20   Ht 1.6 m (5' 3\")   Wt 44.5 kg (98 lb)   LMP 01/25/2021 (Exact Date)   SpO2 100%   BMI 17.36 kg/m²     Constitutional: Well developed, well nourished.  No acute distress.  HEENT: Normocephalic, atraumatic. MMM  Neck: Supple, Full range of motion   Chest/Pulmonary:  No respiratory distress.  Equal expansion   Abdomen; soft, nontender, no guarding, no peritoneal " signs.  Musculoskeletal: No deformity, no edema, neurovascular intact.   Neuro: Clear speech, appropriate, cooperative, cranial nerves II-XII grossly intact.  Psych: Normal mood and affect      US-OB LIMITED TRANSABDOMINAL   Final Result      Viable single intrauterine pregnancy of an estimated gestational age of 19 weeks, 3 days weeks/days with an estimated date of delivery of 11/12/2021. Normal BASSEM. No evidence of abruption      Fetal survey not performed        Results for orders placed or performed during the hospital encounter of 06/21/21   CBC WITH DIFFERENTIAL   Result Value Ref Range    WBC 6.7 4.8 - 10.8 K/uL    RBC 3.72 (L) 4.20 - 5.40 M/uL    Hemoglobin 10.1 (L) 12.0 - 16.0 g/dL    Hematocrit 31.6 (L) 37.0 - 47.0 %    MCV 84.9 81.4 - 97.8 fL    MCH 27.2 27.0 - 33.0 pg    MCHC 32.0 (L) 33.6 - 35.0 g/dL    RDW 47.9 35.9 - 50.0 fL    Platelet Count 154 (L) 164 - 446 K/uL    MPV 10.5 9.0 - 12.9 fL    Neutrophils-Polys 64.80 44.00 - 72.00 %    Lymphocytes 29.40 22.00 - 41.00 %    Monocytes 4.80 0.00 - 13.40 %    Eosinophils 0.40 0.00 - 6.90 %    Basophils 0.30 0.00 - 1.80 %    Immature Granulocytes 0.30 0.00 - 0.90 %    Nucleated RBC 0.00 /100 WBC    Neutrophils (Absolute) 4.34 2.00 - 7.15 K/uL    Lymphs (Absolute) 1.97 1.00 - 4.80 K/uL    Monos (Absolute) 0.32 0.00 - 0.85 K/uL    Eos (Absolute) 0.03 0.00 - 0.51 K/uL    Baso (Absolute) 0.02 0.00 - 0.12 K/uL    Immature Granulocytes (abs) 0.02 0.00 - 0.11 K/uL    NRBC (Absolute) 0.00 K/uL   COMP METABOLIC PANEL   Result Value Ref Range    Sodium 137 135 - 145 mmol/L    Potassium 3.3 (L) 3.6 - 5.5 mmol/L    Chloride 101 96 - 112 mmol/L    Co2 25 20 - 33 mmol/L    Anion Gap 11.0 7.0 - 16.0    Glucose 75 65 - 99 mg/dL    Bun 7 (L) 8 - 22 mg/dL    Creatinine 0.55 0.50 - 1.40 mg/dL    Calcium 9.1 8.5 - 10.5 mg/dL    AST(SGOT) 22 12 - 45 U/L    ALT(SGPT) 12 2 - 50 U/L    Alkaline Phosphatase 58 30 - 99 U/L    Total Bilirubin 0.4 0.1 - 1.5 mg/dL    Albumin 3.8 3.2 -  4.9 g/dL    Total Protein 6.3 6.0 - 8.2 g/dL    Globulin 2.5 1.9 - 3.5 g/dL    A-G Ratio 1.5 g/dL   LIPASE   Result Value Ref Range    Lipase 39 11 - 82 U/L   HCG QUANTITATIVE   Result Value Ref Range    Bhcg 07048.0 (H) 0.0 - 5.0 mIU/mL   URINALYSIS,CULTURE IF INDICATED    Specimen: Urine   Result Value Ref Range    Color Yellow     Character Turbid (A)     Specific Gravity 1.021 <1.035    Ph 7.0 5.0 - 8.0    Glucose Negative Negative mg/dL    Ketones Negative Negative mg/dL    Protein Negative Negative mg/dL    Bilirubin Negative Negative    Urobilinogen, Urine 1.0 Negative    Nitrite Negative Negative    Leukocyte Esterase Trace (A) Negative    Occult Blood Negative Negative    Micro Urine Req Microscopic    URINE MICROSCOPIC (W/UA)   Result Value Ref Range    WBC 2-5 /hpf    RBC 0-2 /hpf    Bacteria Many (A) None /hpf    Epithelial Cells Moderate (A) /hpf    Hyaline Cast 0-2 /lpf   ESTIMATED GFR   Result Value Ref Range    GFR If African American >60 >60 mL/min/1.73 m 2    GFR If Non African American >60 >60 mL/min/1.73 m 2           PROCEDURES     MEDICAL RECORD  I have reviewed patient's medical record and pertinent results are listed.    COURSE & MEDICAL DECISION MAKING  I have reviewed any medical record information, laboratory studies and radiographic results as noted above.    9:30 PM  The pt has declined iv fluids.  She follows up with ob/gyn, and her u/s shows no acute findings. She will follow up, or return here for any other issues or concerns.   She had some streaking of blood when vomiting, but I do  Not suspect Boerhaave's or any other form of esophageal tear.  She is nontoxic-appearing, has no chest pain, and no shortness of breath.  I believe her streaking of blood is from her constant daily hyperemesis.  She also has Zofran from her OB/GYN that she will take.  Patient has declined fluids at this time    I you have had any blood pressure issues while here in the emergency department, please see  your doctor for a further evaluation or work up.    Differential diagnoses include but not limited to: Noni-Mcgregor, Boerhaave's, gastritis,    This patient presents with hyperemesis gravidarum .  At this time, I have counseled the patient/family regarding their medications, pain control, and follow up.  They will continue their medications, if any, as prescribed.  They will return immediately for any worsening symptoms and/or any other medical concerns.  They will see their doctor, or contact the doctor provided, in 1-2 days for follow up.       FINAL IMPRESSION  1. Hyperemesis gravidarum             Electronically signed by: Nikita Esposito D.O., 6/21/2021 9:02 PM

## 2021-06-22 NOTE — ED NOTES
Madyson Ocampo discharged via ambulaiton with [facility].  Discharge instructions given and reviewed, patient educated to follow up with OBGYN, verbalized understanding.  Prescriptions given x 1, called in electronically for pickup, verbalized understanding.  All personal belongings in possession.  No questions at this time.

## 2021-06-26 ENCOUNTER — HOSPITAL ENCOUNTER (EMERGENCY)
Facility: MEDICAL CENTER | Age: 21
End: 2021-06-26
Attending: OBSTETRICS & GYNECOLOGY | Admitting: OBSTETRICS & GYNECOLOGY
Payer: COMMERCIAL

## 2021-06-26 VITALS
BODY MASS INDEX: 17.36 KG/M2 | WEIGHT: 98 LBS | OXYGEN SATURATION: 96 % | RESPIRATION RATE: 16 BRPM | SYSTOLIC BLOOD PRESSURE: 97 MMHG | HEIGHT: 63 IN | DIASTOLIC BLOOD PRESSURE: 53 MMHG | TEMPERATURE: 99.3 F | HEART RATE: 74 BPM

## 2021-06-26 LAB
AMPHET UR QL SCN: NEGATIVE
APPEARANCE UR: ABNORMAL
BARBITURATES UR QL SCN: NEGATIVE
BENZODIAZ UR QL SCN: NEGATIVE
BZE UR QL SCN: NEGATIVE
CANNABINOIDS UR QL SCN: POSITIVE
COLOR UR AUTO: YELLOW
GLUCOSE UR QL STRIP.AUTO: NEGATIVE MG/DL
KETONES UR QL STRIP.AUTO: NEGATIVE MG/DL
LEUKOCYTE ESTERASE UR QL STRIP.AUTO: NEGATIVE
METHADONE UR QL SCN: POSITIVE
NITRITE UR QL STRIP.AUTO: NEGATIVE
OPIATES UR QL SCN: NEGATIVE
OXYCODONE UR QL SCN: NEGATIVE
PCP UR QL SCN: NEGATIVE
PH UR STRIP.AUTO: 7 [PH] (ref 5–8)
PROPOXYPH UR QL SCN: NEGATIVE
PROT UR QL STRIP: 30 MG/DL
RBC UR QL AUTO: NEGATIVE
SP GR UR STRIP.AUTO: 1.02 (ref 1–1.03)

## 2021-06-26 PROCEDURE — 99282 EMERGENCY DEPT VISIT SF MDM: CPT

## 2021-06-26 PROCEDURE — 87086 URINE CULTURE/COLONY COUNT: CPT

## 2021-06-26 PROCEDURE — 81002 URINALYSIS NONAUTO W/O SCOPE: CPT | Mod: XU

## 2021-06-26 PROCEDURE — 80307 DRUG TEST PRSMV CHEM ANLYZR: CPT

## 2021-06-26 ASSESSMENT — ENCOUNTER SYMPTOMS
ABDOMINAL PAIN: 1
FEVER: 0
MYALGIAS: 0
NAUSEA: 0
WEIGHT LOSS: 0
HEARTBURN: 0
VOMITING: 0
DEPRESSION: 0
DIARRHEA: 0
CHILLS: 0
BLURRED VISION: 0

## 2021-06-26 ASSESSMENT — PAIN SCALES - GENERAL: PAINLEVEL: 5 - MODERATE PAIN

## 2021-06-26 ASSESSMENT — FIBROSIS 4 INDEX: FIB4 SCORE: 0.82

## 2021-06-26 NOTE — DISCHARGE INSTRUCTIONS
Follow up in the office for your next scheduled appointment.     Continue taking macrobid as prescribed until finished.     Increase water intake.     Eat small frequent meals.     Abstain from sexual intercourse if cramping.

## 2021-06-26 NOTE — ED PROVIDER NOTES
Emergency Obstetric Consultation     Date of Service  2021    Reason for Consultation  Chief Complaint   Patient presents with   • Cramping       History of Presenting Illness  20 y.o. female who presented 2021 with Reason for consult: nausea  .  Patient is complaining of acute onset of abdominal pain in the right side radiating to the groin.  She states it occurred about 230 yesterday and she describes it at a 6 out of 10 pain and now only a 3 out of 10 pain she reports it is continuous but also waxing and waning in severity.  It is worsened by rolling over in bed at night.  It is worsened sometimes by movement.  The patient states that she has no nausea no vomiting she is having regular bowel movements, tolerating regular diet.  She was able to eat last night.  She is currently hungry at this time.  She she does not feel fetal movement at this time.  No loss of fluid or vaginal bleeding  She does state that she fell and hit her head on the side of a pool on Thursday.    Review of Systems  Review of Systems   Constitutional: Negative for chills, fever and weight loss.   Eyes: Negative for blurred vision.   Cardiovascular: Negative for chest pain.   Gastrointestinal: Positive for abdominal pain. Negative for diarrhea, heartburn, nausea and vomiting.   Genitourinary: Negative for dysuria and urgency.   Musculoskeletal: Negative for myalgias.   Skin: Negative for rash.   Psychiatric/Behavioral: Negative for depression.       Obstetric History    OB History    Para Term  AB Living   1             SAB TAB Ectopic Molar Multiple Live Births                    # Outcome Date GA Lbr [place]/2nd Weight Sex Delivery Anes PTL Lv   1 Current                Gynecologic History  Patient's last menstrual period was 2021 (exact date).    Medical History  No past medical history on file.    Surgical History   has no past surgical history on file.    Family History  family history is not on  file.    Social History   reports that she has never smoked. She has never used smokeless tobacco. She reports that she does not drink alcohol and does not use drugs.    Medications  Medications Prior to Admission   Medication Sig Dispense Refill Last Dose   • cephALEXin (KEFLEX) 500 MG Cap Take 1 capsule by mouth 4 times a day. 40 capsule 0    • ondansetron (ZOFRAN ODT) 4 MG TABLET DISPERSIBLE Take 1 tablet by mouth every 8 hours as needed. 10 tablet 0    • ARIPiprazole (ABILIFY) 10 MG Tab Take 10 mg by mouth every day.      • hydrALAZINE (APRESOLINE) 25 MG Tab Take 25 mg by mouth 3 times a day.          Allergies  No Known Allergies    Physical Exam  Skin:     General: Skin is warm and dry.   HENT:      Head: Normocephalic.   Cardiovascular:      Rate and Rhythm: Normal rate.   Abdominal:      General: Abdomen is flat. Bowel sounds are normal. There is no distension.      Palpations: Abdomen is soft. There is no mass.      Tenderness: There is abdominal tenderness. There is no guarding or rebound.      Hernia: No hernia is present.   Constitutional:       Appearance: Normal appearance. She is normal weight.   Pulmonary:      Effort: Pulmonary effort is normal.   Neurological:      Mental Status: She is alert.   Vitals and nursing note reviewed.         Laboratory               No results for input(s): NTPROBNP in the last 72 hours.         Imaging  No orders to display       Assessment & Plan  Assessment:  Not in labor.   Fetal well-being: normal.   Right-sided abdominal pain, no evidence of acute disease  No evidence of appendicitis      Plan:  Recommend patient follow-up in emergency room or in urgent care for evaluation of potential concussion per the patient's report of falling and hitting her head.            Alaina Beauchamp M.D.

## 2021-06-26 NOTE — PROGRESS NOTES
20 y.o.  @ 20 weeks present to LDA2 c/o hitting her head on THursday night in a pool and having a h/a. Also c/o lower abdominal cramping. Denies vaginal bleeding, LOF, recent intercourse. Denies fever, chills, nausea, or vomiting.   VSS. Doppler 's. Pt states she had a previous miscarriage and is nervous about this pregnancy. Currently taking an antibiotic for the last 3 days for UTI.   Dr. Beauchamp at bedside. Urine sent for culture and UDS.   Discharge order received. PT to f/u in the ER for H/a r/t hitting head.   Discharged to home. Ambulatory with [facility].

## 2021-06-28 LAB
BACTERIA UR CULT: NORMAL
SIGNIFICANT IND 70042: NORMAL
SITE SITE: NORMAL
SOURCE SOURCE: NORMAL

## 2021-07-19 ENCOUNTER — HOSPITAL ENCOUNTER (EMERGENCY)
Facility: MEDICAL CENTER | Age: 21
End: 2021-07-19
Attending: OBSTETRICS & GYNECOLOGY | Admitting: OBSTETRICS & GYNECOLOGY
Payer: COMMERCIAL

## 2021-07-19 VITALS
HEART RATE: 62 BPM | RESPIRATION RATE: 18 BRPM | OXYGEN SATURATION: 62 % | BODY MASS INDEX: 17.36 KG/M2 | TEMPERATURE: 97.7 F | HEIGHT: 63 IN | WEIGHT: 98 LBS

## 2021-07-19 LAB
APPEARANCE UR: CLEAR
COLOR UR AUTO: YELLOW
GLUCOSE UR QL STRIP.AUTO: NEGATIVE MG/DL
KETONES UR QL STRIP.AUTO: NEGATIVE MG/DL
LEUKOCYTE ESTERASE UR QL STRIP.AUTO: NEGATIVE
NITRITE UR QL STRIP.AUTO: NEGATIVE
PH UR STRIP.AUTO: 6 [PH] (ref 5–8)
PROT UR QL STRIP: NEGATIVE MG/DL
RBC UR QL AUTO: ABNORMAL
SP GR UR STRIP.AUTO: 1.02 (ref 1–1.03)

## 2021-07-19 PROCEDURE — 81002 URINALYSIS NONAUTO W/O SCOPE: CPT

## 2021-07-19 PROCEDURE — 302449 STATCHG TRIAGE ONLY (STATISTIC)

## 2021-07-19 ASSESSMENT — PAIN SCALES - GENERAL: PAINLEVEL: 0 - NO PAIN

## 2021-07-19 ASSESSMENT — FIBROSIS 4 INDEX: FIB4 SCORE: .8660254037844386468

## 2021-07-20 NOTE — PROGRESS NOTES
21 y.o.  EDC  EGA 22.4 here to LDA4 with her Grandmother Corina. Pt take 42 mg Methadone daily. Pt reports she received her Saturday morning dose but the clinic would not give her the  morning dose because she did not have her lockbox. Pt states she started withdrawing last night. She was able to go to the Methadone clinic and receive her Monday morning dose, but she was anxious and concerned about the baby.   Clean catch urine specimen obtained and dipped. SG10.25 trace blood. PT states she is treated for frequent UTI's. PT reports positive fetal movement, denies vaginal bleeding, LOF, N/V, or urinary symptoms.   Doppler , [place] showing irritability. Orally hydrating.   Pt up to bathroom. Irritability reduced.   Report to Dr. Dominguez. Discharge order received.   PT to f/u in office with Dr. Beauchamp in office.

## 2021-08-24 LAB — GLUCOSE 1H P 50 G GLC PO SERPL-MCNC: 148 MG/DL

## 2021-10-21 LAB — GP B STREP DNA SPEC QL NAA+PROBE: NEGATIVE

## 2021-11-01 ENCOUNTER — HOSPITAL ENCOUNTER (OUTPATIENT)
Dept: OBGYN | Facility: MEDICAL CENTER | Age: 21
End: 2021-11-01
Attending: OBSTETRICS & GYNECOLOGY
Payer: COMMERCIAL

## 2021-11-01 LAB
SARS-COV-2 RNA RESP QL NAA+PROBE: NOTDETECTED
SPECIMEN SOURCE: NORMAL

## 2021-11-01 PROCEDURE — U0005 INFEC AGEN DETEC AMPLI PROBE: HCPCS

## 2021-11-01 PROCEDURE — U0003 INFECTIOUS AGENT DETECTION BY NUCLEIC ACID (DNA OR RNA); SEVERE ACUTE RESPIRATORY SYNDROME CORONAVIRUS 2 (SARS-COV-2) (CORONAVIRUS DISEASE [COVID-19]), AMPLIFIED PROBE TECHNIQUE, MAKING USE OF HIGH THROUGHPUT TECHNOLOGIES AS DESCRIBED BY CMS-2020-01-R: HCPCS

## 2021-11-01 NOTE — PROGRESS NOTES
Pt here for Covid-19 test. Reports no s/s. Test conducted and sent to lab. Self-isolation instructions given.

## 2021-11-04 ENCOUNTER — HOSPITAL ENCOUNTER (INPATIENT)
Facility: MEDICAL CENTER | Age: 21
LOS: 2 days | End: 2021-11-07
Attending: OBSTETRICS & GYNECOLOGY | Admitting: OBSTETRICS & GYNECOLOGY
Payer: COMMERCIAL

## 2021-11-04 ENCOUNTER — APPOINTMENT (OUTPATIENT)
Dept: OBGYN | Facility: MEDICAL CENTER | Age: 21
End: 2021-11-04
Attending: OBSTETRICS & GYNECOLOGY
Payer: COMMERCIAL

## 2021-11-04 DIAGNOSIS — O99.013 ANEMIA AFFECTING PREGNANCY IN THIRD TRIMESTER: ICD-10-CM

## 2021-11-04 DIAGNOSIS — N30.00 ACUTE CYSTITIS WITHOUT HEMATURIA: ICD-10-CM

## 2021-11-04 LAB — GLUCOSE BLD-MCNC: 51 MG/DL (ref 65–99)

## 2021-11-04 PROCEDURE — 3E0DXGC INTRODUCTION OF OTHER THERAPEUTIC SUBSTANCE INTO MOUTH AND PHARYNX, EXTERNAL APPROACH: ICD-10-PCS | Performed by: OBSTETRICS & GYNECOLOGY

## 2021-11-04 PROCEDURE — 85025 COMPLETE CBC W/AUTO DIFF WBC: CPT

## 2021-11-04 PROCEDURE — 36415 COLL VENOUS BLD VENIPUNCTURE: CPT

## 2021-11-04 PROCEDURE — 82962 GLUCOSE BLOOD TEST: CPT

## 2021-11-04 ASSESSMENT — PATIENT HEALTH QUESTIONNAIRE - PHQ9
SUM OF ALL RESPONSES TO PHQ QUESTIONS 1-9: 2
2. FEELING DOWN, DEPRESSED, IRRITABLE, OR HOPELESS: SEVERAL DAYS
1. LITTLE INTEREST OR PLEASURE IN DOING THINGS: NOT AT ALL
8. MOVING OR SPEAKING SO SLOWLY THAT OTHER PEOPLE COULD HAVE NOTICED. OR THE OPPOSITE, BEING SO FIGETY OR RESTLESS THAT YOU HAVE BEEN MOVING AROUND A LOT MORE THAN USUAL: NOT AT ALL
4. FEELING TIRED OR HAVING LITTLE ENERGY: NOT AT ALL
6. FEELING BAD ABOUT YOURSELF - OR THAT YOU ARE A FAILURE OR HAVE LET YOURSELF OR YOUR FAMILY DOWN: SEVERAL DAYS
7. TROUBLE CONCENTRATING ON THINGS, SUCH AS READING THE NEWSPAPER OR WATCHING TELEVISION: NOT AT ALL
5. POOR APPETITE OR OVEREATING: NOT AT ALL
3. TROUBLE FALLING OR STAYING ASLEEP OR SLEEPING TOO MUCH: NOT AT ALL
SUM OF ALL RESPONSES TO PHQ9 QUESTIONS 1 AND 2: 1
9. THOUGHTS THAT YOU WOULD BE BETTER OFF DEAD, OR OF HURTING YOURSELF: NOT AT ALL

## 2021-11-04 ASSESSMENT — LIFESTYLE VARIABLES
EVER FELT BAD OR GUILTY ABOUT YOUR DRINKING: NO
TOTAL SCORE: 0
EVER_SMOKED: NEVER
CONSUMPTION TOTAL: NEGATIVE
HAVE PEOPLE ANNOYED YOU BY CRITICIZING YOUR DRINKING: NO
DOES PATIENT WANT TO STOP DRINKING: NO
HOW MANY TIMES IN THE PAST YEAR HAVE YOU HAD 5 OR MORE DRINKS IN A DAY: 0
TOTAL SCORE: 0
EVER HAD A DRINK FIRST THING IN THE MORNING TO STEADY YOUR NERVES TO GET RID OF A HANGOVER: NO
ON A TYPICAL DAY WHEN YOU DRINK ALCOHOL HOW MANY DRINKS DO YOU HAVE: 0
AVERAGE NUMBER OF DAYS PER WEEK YOU HAVE A DRINK CONTAINING ALCOHOL: 0
ALCOHOL_USE: NO
TOTAL SCORE: 0
HAVE YOU EVER FELT YOU SHOULD CUT DOWN ON YOUR DRINKING: NO

## 2021-11-04 ASSESSMENT — FIBROSIS 4 INDEX
FIB4 SCORE: 0.83
FIB4 SCORE: .8660254037844386468

## 2021-11-05 ENCOUNTER — ANESTHESIA EVENT (OUTPATIENT)
Dept: ANESTHESIOLOGY | Facility: MEDICAL CENTER | Age: 21
End: 2021-11-05
Payer: COMMERCIAL

## 2021-11-05 ENCOUNTER — ANESTHESIA (OUTPATIENT)
Dept: ANESTHESIOLOGY | Facility: MEDICAL CENTER | Age: 21
End: 2021-11-05
Payer: COMMERCIAL

## 2021-11-05 LAB
ACTION RH IMMUNE GLOB 8505RHG: NORMAL
ANISOCYTOSIS BLD QL SMEAR: ABNORMAL
BASOPHILS # BLD AUTO: 0.2 % (ref 0–1.8)
BASOPHILS # BLD: 0.02 K/UL (ref 0–0.12)
COMMENT 1642: NORMAL
DACRYOCYTES BLD QL SMEAR: NORMAL
EOSINOPHIL # BLD AUTO: 0.01 K/UL (ref 0–0.51)
EOSINOPHIL NFR BLD: 0.1 % (ref 0–6.9)
ERYTHROCYTE [DISTWIDTH] IN BLOOD BY AUTOMATED COUNT: 43.8 FL (ref 35.9–50)
GIANT PLATELETS BLD QL SMEAR: NORMAL
GLUCOSE BLD-MCNC: 86 MG/DL (ref 65–99)
GLUCOSE BLD-MCNC: 88 MG/DL (ref 65–99)
HCT VFR BLD AUTO: 23.8 % (ref 37–47)
HGB BLD-MCNC: 7.1 G/DL (ref 12–16)
HOLDING TUBE BB 8507: NORMAL
HYPOCHROMIA BLD QL SMEAR: ABNORMAL
IMM GRANULOCYTES # BLD AUTO: 0.06 K/UL (ref 0–0.11)
IMM GRANULOCYTES NFR BLD AUTO: 0.7 % (ref 0–0.9)
IMMUNE ROSETTING TEST 8505FMH: NORMAL
LYMPHOCYTES # BLD AUTO: 1.46 K/UL (ref 1–4.8)
LYMPHOCYTES NFR BLD: 16 % (ref 22–41)
MACROCYTES BLD QL SMEAR: ABNORMAL
MCH RBC QN AUTO: 21.1 PG (ref 27–33)
MCHC RBC AUTO-ENTMCNC: 29.8 G/DL (ref 33.6–35)
MCV RBC AUTO: 70.8 FL (ref 81.4–97.8)
MICROCYTES BLD QL SMEAR: ABNORMAL
MONOCYTES # BLD AUTO: 0.37 K/UL (ref 0–0.85)
MONOCYTES NFR BLD AUTO: 4.1 % (ref 0–13.4)
MORPHOLOGY BLD-IMP: NORMAL
NEUTROPHILS # BLD AUTO: 7.19 K/UL (ref 2–7.15)
NEUTROPHILS NFR BLD: 78.9 % (ref 44–72)
NRBC # BLD AUTO: 0 K/UL
NRBC BLD-RTO: 0 /100 WBC
NUMBER OF RH DOSES IND 8505RD: 1
OVALOCYTES BLD QL SMEAR: NORMAL
PLATELET # BLD AUTO: 161 K/UL (ref 164–446)
PLATELET BLD QL SMEAR: NORMAL
PMV BLD AUTO: 11.6 FL (ref 9–12.9)
POIKILOCYTOSIS BLD QL SMEAR: NORMAL
POLYCHROMASIA BLD QL SMEAR: NORMAL
RBC # BLD AUTO: 3.36 M/UL (ref 4.2–5.4)
RBC BLD AUTO: PRESENT
SCHISTOCYTES BLD QL SMEAR: NORMAL
WBC # BLD AUTO: 9.1 K/UL (ref 4.8–10.8)

## 2021-11-05 PROCEDURE — 85461 HEMOGLOBIN FETAL: CPT

## 2021-11-05 PROCEDURE — 3E0S3BZ INTRODUCTION OF ANESTHETIC AGENT INTO EPIDURAL SPACE, PERCUTANEOUS APPROACH: ICD-10-PCS | Performed by: ANESTHESIOLOGY

## 2021-11-05 PROCEDURE — A9270 NON-COVERED ITEM OR SERVICE: HCPCS | Performed by: OBSTETRICS & GYNECOLOGY

## 2021-11-05 PROCEDURE — 700111 HCHG RX REV CODE 636 W/ 250 OVERRIDE (IP)

## 2021-11-05 PROCEDURE — 700105 HCHG RX REV CODE 258: Performed by: OBSTETRICS & GYNECOLOGY

## 2021-11-05 PROCEDURE — 59409 OBSTETRICAL CARE: CPT

## 2021-11-05 PROCEDURE — 700111 HCHG RX REV CODE 636 W/ 250 OVERRIDE (IP): Performed by: OBSTETRICS & GYNECOLOGY

## 2021-11-05 PROCEDURE — 303615 HCHG EPIDURAL/SPINAL ANESTHESIA FOR LABOR

## 2021-11-05 PROCEDURE — 36415 COLL VENOUS BLD VENIPUNCTURE: CPT

## 2021-11-05 PROCEDURE — 770002 HCHG ROOM/CARE - OB PRIVATE (112)

## 2021-11-05 PROCEDURE — 700111 HCHG RX REV CODE 636 W/ 250 OVERRIDE (IP): Performed by: ANESTHESIOLOGY

## 2021-11-05 PROCEDURE — 3E0334Z INTRODUCTION OF SERUM, TOXOID AND VACCINE INTO PERIPHERAL VEIN, PERCUTANEOUS APPROACH: ICD-10-PCS | Performed by: OBSTETRICS & GYNECOLOGY

## 2021-11-05 PROCEDURE — 82962 GLUCOSE BLOOD TEST: CPT

## 2021-11-05 PROCEDURE — 304965 HCHG RECOVERY SERVICES

## 2021-11-05 PROCEDURE — 700102 HCHG RX REV CODE 250 W/ 637 OVERRIDE(OP): Performed by: OBSTETRICS & GYNECOLOGY

## 2021-11-05 PROCEDURE — 99999 PR NO CHARGE: CPT | Performed by: OBSTETRICS & GYNECOLOGY

## 2021-11-05 PROCEDURE — 0KQM0ZZ REPAIR PERINEUM MUSCLE, OPEN APPROACH: ICD-10-PCS | Performed by: OBSTETRICS & GYNECOLOGY

## 2021-11-05 RX ORDER — DOCUSATE SODIUM 100 MG/1
100 CAPSULE, LIQUID FILLED ORAL 2 TIMES DAILY PRN
Status: DISCONTINUED | OUTPATIENT
Start: 2021-11-05 | End: 2021-11-07 | Stop reason: HOSPADM

## 2021-11-05 RX ORDER — TERBUTALINE SULFATE 1 MG/ML
INJECTION, SOLUTION SUBCUTANEOUS
Status: COMPLETED
Start: 2021-11-05 | End: 2021-11-05

## 2021-11-05 RX ORDER — CARBOPROST TROMETHAMINE 250 UG/ML
250 INJECTION, SOLUTION INTRAMUSCULAR
Status: DISCONTINUED | OUTPATIENT
Start: 2021-11-05 | End: 2021-11-07 | Stop reason: HOSPADM

## 2021-11-05 RX ORDER — ROPIVACAINE HYDROCHLORIDE 2 MG/ML
INJECTION, SOLUTION EPIDURAL; INFILTRATION; PERINEURAL CONTINUOUS
Status: DISCONTINUED | OUTPATIENT
Start: 2021-11-05 | End: 2021-11-06

## 2021-11-05 RX ORDER — PEN NEEDLE, DIABETIC 32GX 5/32"
NEEDLE, DISPOSABLE MISCELLANEOUS
COMMUNITY
Start: 2021-10-21

## 2021-11-05 RX ORDER — SODIUM CHLORIDE, SODIUM LACTATE, POTASSIUM CHLORIDE, CALCIUM CHLORIDE 600; 310; 30; 20 MG/100ML; MG/100ML; MG/100ML; MG/100ML
INJECTION, SOLUTION INTRAVENOUS CONTINUOUS
Status: DISCONTINUED | OUTPATIENT
Start: 2021-11-05 | End: 2021-11-07 | Stop reason: HOSPADM

## 2021-11-05 RX ORDER — MISOPROSTOL 200 UG/1
800 TABLET ORAL
Status: DISCONTINUED | OUTPATIENT
Start: 2021-11-05 | End: 2021-11-05 | Stop reason: HOSPADM

## 2021-11-05 RX ORDER — LAMOTRIGINE 25 MG/1
25 TABLET ORAL 2 TIMES DAILY
Status: DISCONTINUED | OUTPATIENT
Start: 2021-11-05 | End: 2021-11-07 | Stop reason: HOSPADM

## 2021-11-05 RX ORDER — ROPIVACAINE HYDROCHLORIDE 2 MG/ML
INJECTION, SOLUTION EPIDURAL; INFILTRATION; PERINEURAL
Status: COMPLETED
Start: 2021-11-05 | End: 2021-11-05

## 2021-11-05 RX ORDER — ACETAMINOPHEN 325 MG/1
325 TABLET ORAL EVERY 4 HOURS PRN
Status: DISCONTINUED | OUTPATIENT
Start: 2021-11-05 | End: 2021-11-07 | Stop reason: HOSPADM

## 2021-11-05 RX ORDER — ROPIVACAINE HYDROCHLORIDE 2 MG/ML
INJECTION, SOLUTION EPIDURAL; INFILTRATION
Status: DISCONTINUED | OUTPATIENT
Start: 2021-11-05 | End: 2021-11-05 | Stop reason: SURG

## 2021-11-05 RX ORDER — MISOPROSTOL 200 UG/1
600 TABLET ORAL
Status: DISCONTINUED | OUTPATIENT
Start: 2021-11-05 | End: 2021-11-07 | Stop reason: HOSPADM

## 2021-11-05 RX ORDER — SODIUM CHLORIDE, SODIUM LACTATE, POTASSIUM CHLORIDE, AND CALCIUM CHLORIDE .6; .31; .03; .02 G/100ML; G/100ML; G/100ML; G/100ML
250 INJECTION, SOLUTION INTRAVENOUS PRN
Status: DISCONTINUED | OUTPATIENT
Start: 2021-11-05 | End: 2021-11-05 | Stop reason: HOSPADM

## 2021-11-05 RX ORDER — BUPIVACAINE HYDROCHLORIDE 2.5 MG/ML
INJECTION, SOLUTION EPIDURAL; INFILTRATION; INTRACAUDAL PRN
Status: DISCONTINUED | OUTPATIENT
Start: 2021-11-05 | End: 2021-11-05 | Stop reason: SURG

## 2021-11-05 RX ORDER — BISMUTH SUBSALICYLATE 262MG/15ML
SUSPENSION, ORAL (FINAL DOSE FORM) ORAL
COMMUNITY
Start: 2021-10-30

## 2021-11-05 RX ORDER — LAMOTRIGINE 25 MG/1
25 TABLET ORAL 2 TIMES DAILY
COMMUNITY
Start: 2021-11-03

## 2021-11-05 RX ORDER — METHYLERGONOVINE MALEATE 0.2 MG/ML
0.2 INJECTION INTRAVENOUS
Status: DISCONTINUED | OUTPATIENT
Start: 2021-11-05 | End: 2021-11-07 | Stop reason: HOSPADM

## 2021-11-05 RX ORDER — ONDANSETRON 2 MG/ML
4 INJECTION INTRAMUSCULAR; INTRAVENOUS EVERY 4 HOURS PRN
Status: DISCONTINUED | OUTPATIENT
Start: 2021-11-05 | End: 2021-11-07 | Stop reason: HOSPADM

## 2021-11-05 RX ORDER — SODIUM CHLORIDE, SODIUM LACTATE, POTASSIUM CHLORIDE, AND CALCIUM CHLORIDE .6; .31; .03; .02 G/100ML; G/100ML; G/100ML; G/100ML
1000 INJECTION, SOLUTION INTRAVENOUS
Status: DISCONTINUED | OUTPATIENT
Start: 2021-11-05 | End: 2021-11-05 | Stop reason: HOSPADM

## 2021-11-05 RX ORDER — BUPIVACAINE HYDROCHLORIDE 2.5 MG/ML
INJECTION, SOLUTION EPIDURAL; INFILTRATION; INTRACAUDAL
Status: COMPLETED
Start: 2021-11-05 | End: 2021-11-05

## 2021-11-05 RX ORDER — LURASIDONE HYDROCHLORIDE 20 MG/1
20 TABLET, FILM COATED ORAL
Status: DISCONTINUED | OUTPATIENT
Start: 2021-11-05 | End: 2021-11-07 | Stop reason: HOSPADM

## 2021-11-05 RX ORDER — METHADONE HYDROCHLORIDE 10 MG/ML
47 CONCENTRATE ORAL DAILY
Status: DISCONTINUED | OUTPATIENT
Start: 2021-11-05 | End: 2021-11-07 | Stop reason: HOSPADM

## 2021-11-05 RX ORDER — CALCIUM CITRATE/VITAMIN D3 200MG-6.25
TABLET ORAL
Status: ON HOLD | COMMUNITY
Start: 2021-10-29 | End: 2021-11-07

## 2021-11-05 RX ORDER — IBUPROFEN 600 MG/1
600 TABLET ORAL EVERY 6 HOURS PRN
Status: DISCONTINUED | OUTPATIENT
Start: 2021-11-05 | End: 2021-11-07 | Stop reason: HOSPADM

## 2021-11-05 RX ORDER — LURASIDONE HYDROCHLORIDE 40 MG/1
20 TABLET, FILM COATED ORAL
COMMUNITY
Start: 2021-09-08

## 2021-11-05 RX ORDER — INSULIN ASPART 100 [IU]/ML
INJECTION, SOLUTION INTRAVENOUS; SUBCUTANEOUS
Status: ON HOLD | COMMUNITY
Start: 2021-10-28 | End: 2021-11-05

## 2021-11-05 RX ORDER — SODIUM CHLORIDE, SODIUM LACTATE, POTASSIUM CHLORIDE, CALCIUM CHLORIDE 600; 310; 30; 20 MG/100ML; MG/100ML; MG/100ML; MG/100ML
INJECTION, SOLUTION INTRAVENOUS PRN
Status: DISCONTINUED | OUTPATIENT
Start: 2021-11-05 | End: 2021-11-07 | Stop reason: HOSPADM

## 2021-11-05 RX ORDER — INSULIN GLARGINE 100 [IU]/ML
INJECTION, SOLUTION SUBCUTANEOUS
Status: ON HOLD | COMMUNITY
Start: 2021-11-03 | End: 2021-11-05

## 2021-11-05 RX ADMIN — FENTANYL CITRATE 100 MCG: 50 INJECTION, SOLUTION INTRAMUSCULAR; INTRAVENOUS at 06:06

## 2021-11-05 RX ADMIN — ROPIVACAINE HYDROCHLORIDE 200 MG: 2 INJECTION, SOLUTION EPIDURAL; INFILTRATION at 06:49

## 2021-11-05 RX ADMIN — TERBUTALINE SULFATE 0.25 MG: 1 INJECTION SUBCUTANEOUS at 03:12

## 2021-11-05 RX ADMIN — SODIUM CHLORIDE, POTASSIUM CHLORIDE, SODIUM LACTATE AND CALCIUM CHLORIDE: 600; 310; 30; 20 INJECTION, SOLUTION INTRAVENOUS at 06:06

## 2021-11-05 RX ADMIN — FENTANYL CITRATE 100 MCG: 50 INJECTION, SOLUTION INTRAMUSCULAR; INTRAVENOUS at 06:57

## 2021-11-05 RX ADMIN — TERBUTALINE SULFATE 0.25 MG: 1 INJECTION SUBCUTANEOUS at 07:25

## 2021-11-05 RX ADMIN — IBUPROFEN 600 MG: 600 TABLET ORAL at 17:37

## 2021-11-05 RX ADMIN — OXYTOCIN 2000 ML/HR: 10 INJECTION, SOLUTION INTRAMUSCULAR; INTRAVENOUS at 09:23

## 2021-11-05 RX ADMIN — ONDANSETRON 4 MG: 2 INJECTION INTRAMUSCULAR; INTRAVENOUS at 10:05

## 2021-11-05 RX ADMIN — MISOPROSTOL 25 MCG: 100 TABLET ORAL at 01:02

## 2021-11-05 RX ADMIN — ROPIVACAINE HYDROCHLORIDE 10 ML/HR: 2 INJECTION, SOLUTION EPIDURAL; INFILTRATION at 07:01

## 2021-11-05 RX ADMIN — SODIUM CHLORIDE, POTASSIUM CHLORIDE, SODIUM LACTATE AND CALCIUM CHLORIDE: 600; 310; 30; 20 INJECTION, SOLUTION INTRAVENOUS at 02:30

## 2021-11-05 RX ADMIN — LURASIDONE HYDROCHLORIDE 20 MG: 20 TABLET, FILM COATED ORAL at 18:44

## 2021-11-05 RX ADMIN — METHADONE HYDROCHLORIDE 47 MG: 10 CONCENTRATE ORAL at 11:41

## 2021-11-05 RX ADMIN — BUPIVACAINE HYDROCHLORIDE 10 ML: 2.5 INJECTION, SOLUTION EPIDURAL; INFILTRATION; INTRACAUDAL; PERINEURAL at 06:57

## 2021-11-05 RX ADMIN — OXYTOCIN 125 ML/HR: 10 INJECTION, SOLUTION INTRAMUSCULAR; INTRAVENOUS at 11:01

## 2021-11-05 ASSESSMENT — PAIN SCALES - GENERAL
PAINLEVEL: 6
PAIN_LEVEL: 3

## 2021-11-05 ASSESSMENT — PAIN DESCRIPTION - PAIN TYPE
TYPE: ACUTE PAIN
TYPE: ACUTE PAIN

## 2021-11-05 NOTE — ANESTHESIA POSTPROCEDURE EVALUATION
Patient: Madyson Ocampo    Procedure Summary     Date: 11/05/21 Room / Location:     Anesthesia Start: 0643 Anesthesia Stop: 0920    Procedure: Labor Epidural Diagnosis:     Scheduled Providers:  Responsible Provider: Vito Atwood M.D.    Anesthesia Type: epidural ASA Status: 2          Final Anesthesia Type: epidural  Last vitals  BP   Blood Pressure: (!) 94/50    Temp   37.2 °C (99 °F)    Pulse   82   Resp   (!) 48    SpO2   99 %      Anesthesia Post Evaluation    Patient location during evaluation: floor  Patient participation: complete - patient participated  Level of consciousness: awake and alert  Pain score: 3    Airway patency: patent  Anesthetic complications: no  Cardiovascular status: hemodynamically stable  Respiratory status: acceptable  Hydration status: euvolemic    PONV: none          No complications documented.     Nurse Pain Score: 3 (NPRS)

## 2021-11-05 NOTE — CARE PLAN
Problem: Knowledge Deficit - L&D  Goal: Patient and family/caregivers will demonstrate understanding of plan of care, disease process/condition, diagnostic tests and medications  Outcome: Progressing     Problem: Psychosocial - L&D  Goal: Patient will be able to discuss coping skills during hospitalization  Outcome: Progressing     Problem: Risk for Infection and Impaired Wound Healing  Goal: Patient will remain free from infection  Outcome: Progressing     Problem: Risk for Injury  Goal: Patient and fetus will be free of preventable injury/complications  Outcome: Progressing     Problem: Pain  Goal: Patient's pain will be alleviated or reduced to the patient’s comfort goal  Outcome: Progressing   The patient is Stable - Low risk of patient condition declining or worsening         Progress made toward(s) clinical / shift goals:  progressing

## 2021-11-05 NOTE — ANESTHESIA TIME REPORT
Anesthesia Start and Stop Event Times     Date Time Event    11/5/2021 0640 Ready for Procedure     0643 Anesthesia Start     0920 Anesthesia Stop        Responsible Staff  11/05/21    Name Role Begin End    Keshav Jin M.D. Anesth 0643 0700    Vito Atwood M.D. Anesth 0700 0920        Preop Diagnosis (Free Text):  Pre-op Diagnosis             Preop Diagnosis (Codes):    Premium Reason  A. 3PM - 7AM    Comments:

## 2021-11-05 NOTE — PROGRESS NOTES
0140- handoff report received from Poppy JEREZ  0304- pt repositioned to hand and knees during a prolonged deceleration with IV LR bolusing, O2 running at 10L via face mask and  0.25 Terbutaline given.  Dr. Stearns made aware of pt having variables, prolonged decels and interventions taken.   0620- pt requesting epidural placement. Dr. Jin made aware, IV LR bolusing, consent signed.   0640- Dr. Jin at bedside for epidural placement.   0700- pt reports pain relief. Bedside handoff report given to Brina JEREZ.   0715- Dr. Beauchamp at bedside assessing pt.

## 2021-11-05 NOTE — PROGRESS NOTES
"Labor Progress Note    Madyson Ocampo   38w1d      Subjective:  Uterine contractions:yes  Pain: No    Objective:   Vitals:    11/04/21 2035 11/05/21 0300 11/05/21 0600   BP: 107/54 102/63    Pulse: 100 64    Resp: (!) 48     Temp: 37.1 °C (98.8 °F) 37.3 °C (99.2 °F) 37.2 °C (99 °F)   TempSrc: Temporal     SpO2: 96%     Weight: 44.9 kg (99 lb)     Height: 1.575 m (5' 2\")       Fetal heart variability: moderate  Fetal Heart Rate decelerations: early  Fetal Heart Rate accelerations: yes  Baseline FHR: 140 per minute  Uterine contractions: regular, every 2 minutes  Cervical: 100% ;  Dilatation .9 ; Station negative0    Membranes ruptured: .yes    Meds:   Epidural : .yes  Pitocin: .no    Labs:  Recent Results (from the past 24 hour(s))   Hold Blood Bank Specimen (Not Tested)    Collection Time: 11/04/21  8:00 PM   Result Value Ref Range    Holding Tube - Bb DONE    CBC WITH DIFFERENTIAL    Collection Time: 11/04/21  8:00 PM   Result Value Ref Range    WBC 9.1 4.8 - 10.8 K/uL    RBC 3.36 (L) 4.20 - 5.40 M/uL    Hemoglobin 7.1 (L) 12.0 - 16.0 g/dL    Hematocrit 23.8 (L) 37.0 - 47.0 %    MCV 70.8 (L) 81.4 - 97.8 fL    MCH 21.1 (L) 27.0 - 33.0 pg    MCHC 29.8 (L) 33.6 - 35.0 g/dL    RDW 43.8 35.9 - 50.0 fL    Platelet Count 161 (L) 164 - 446 K/uL    MPV 11.6 9.0 - 12.9 fL    Neutrophils-Polys 78.90 (H) 44.00 - 72.00 %    Lymphocytes 16.00 (L) 22.00 - 41.00 %    Monocytes 4.10 0.00 - 13.40 %    Eosinophils 0.10 0.00 - 6.90 %    Basophils 0.20 0.00 - 1.80 %    Immature Granulocytes 0.70 0.00 - 0.90 %    Nucleated RBC 0.00 /100 WBC    Neutrophils (Absolute) 7.19 (H) 2.00 - 7.15 K/uL    Lymphs (Absolute) 1.46 1.00 - 4.80 K/uL    Monos (Absolute) 0.37 0.00 - 0.85 K/uL    Eos (Absolute) 0.01 0.00 - 0.51 K/uL    Baso (Absolute) 0.02 0.00 - 0.12 K/uL    Immature Granulocytes (abs) 0.06 0.00 - 0.11 K/uL    NRBC (Absolute) 0.00 K/uL    Hypochromia 1+     Anisocytosis 2+ (A)     Macrocytosis 1+     Microcytosis 2+ (A)  "   PERIPHERAL SMEAR REVIEW    Collection Time: 11/04/21  8:00 PM   Result Value Ref Range    Peripheral Smear Review see below    PLATELET ESTIMATE    Collection Time: 11/04/21  8:00 PM   Result Value Ref Range    Plt Estimation Normal    MORPHOLOGY    Collection Time: 11/04/21  8:00 PM   Result Value Ref Range    RBC Morphology Present     Giant Platelets 1+     Polychromia 1+     Poikilocytosis 1+     Ovalocytes 1+     Schistocytes 1+     Tear Drop Cells 1+    DIFFERENTIAL COMMENT    Collection Time: 11/04/21  8:00 PM   Result Value Ref Range    Comments-Diff see below    POCT glucose device results    Collection Time: 11/04/21 10:20 PM   Result Value Ref Range    Glucose - Accu-Ck 51 (L) 65 - 99 mg/dL   POCT glucose device results    Collection Time: 11/05/21  2:48 AM   Result Value Ref Range    Glucose - Accu-Ck 88 65 - 99 mg/dL       Assessment:   38w1d  Labor State: Active phase labor.      Alaina Beauchamp M.D.

## 2021-11-05 NOTE — CARE PLAN
The patient is Stable - Low risk of patient condition declining or worsening         Progress made toward(s) clinical / shift goals:    Problem: Risk for Infection and Impaired Wound Healing  Goal: Patient will remain free from infection  Outcome: Progressing  Note: Pt afebrile, no s/s of infection     Problem: Pain  Goal: Patient's pain will be alleviated or reduced to the patient’s comfort goal  Outcome: Progressing  Note: Pain POC discussed. Pt denies pain at this time, will call out when in active labor and pain analgesics needed

## 2021-11-05 NOTE — PROGRESS NOTES
Called to 222 bedside for  of  38.1 gestation . Cllear fluid, afebrile. Insulin dependant GDM, no insulin given during labor, last maternal  FSBS 87.  Mother takes 47 mg Methadone daily.     0920 , loose nuchal reduced, baby to maternal abdomen. Large amount of secretions. Baby dried and stimulated, with little response. Cord cut, taken to warmer. Baby dusky with decreased response to stimulation. . Pulse ox applied to right hand, SpO2 in 70's. blowby O2 given for 1 minute SpO2 increased to high 70's. Baby bulb suctioned, CPT performed. Saturation in low 80's.  CPAP for 2 minutes. RT called to bedside.   Ivan Chávez RT present, CPT performed. Additional blowby and CPAP by RT. Baby transferred to NBN to be placed under O2 [place].   Report to Ros Walker and Brinda BACH RN's.

## 2021-11-05 NOTE — L&D DELIVERY NOTE
Procedure:   w-w/o: with repair of laceration    Madyson Ocampo is a 21 y.o.  at [id number] is an established patient of .  Patient was admitted for induction of labor due to Gestational DM A2.  Patient received Cytotec for augmentation of labor process.  She progressed to complete/complete and a +2 station.  Patient then pushed with the fetal head delivering over intact Yes,  perineum.  Fetal head delivered in an OA position.  Nuchal cordwas present and reduced.  Anterior and posterior shoulders then delivered with complications with the rest of the body to follow.    The infant male was placed on the maternal abdomen and attended by awaiting nursing staff.  The Apgars were 8 at 1 minute and 8 at 5 minutes.  After approximately 1 to 3 minutes the cord was then clamped x2 and cut.  The placenta delivered spontaneously, Bent[place] ,intact with a three-vessel cord.  There was a 2nd degree left labial and 2 nd degree perineal   laceration.  Lacerations were repaired with 2-0 chromic and 3-0 chromic in the standard fashion.  Estimated blood loss 300 cc  Sponge count correct  Mother to recover in labor and delivery, infant to be transferred to well-baby nursery.

## 2021-11-05 NOTE — PROGRESS NOTES
"0700- BS report received from Karon JEREZ, care assumed. While in room, FHR started decelerating, position changed, 10L oxygen applied. Fetal heart rate recovered.   0715- Dr. Beauchamp at BS to evaluate pt, SVE 9/90/0.  0730- Dr. Beauchamp at BS to evaluate uterine contractions, strips reviewed, orders received to give 0.25 Terbutaline.    0845- FHR showing decelerations, position changed. SVE c/+3. Dr. Beauchamp notified to attend delivery.   0850- Dr. Beauchamp at BS to evaluate pt and attend delivery.    0920-  of a viable baby boy \"Jerson\", 8/8 APGARS assigned. [place] transported up to NBN with respiratory therapist and FOB. All questions and concerns answered at this time. Mother recovering with grandma at BS.     1245- Pt up tp BR, +void. Pt transferred up to PP unit via wheel chair with RN, FOB and Grandma at side, along with personal belongings. BS report given to Renata JEREZ, care assumed. VSS. Pt and FOB deny any questions or concerns at this time.       "

## 2021-11-05 NOTE — H&P
DATE OF ADMISSION:  2021     HISTORY OF PRESENT ILLNESS:  This is a 21-year-old -0-1-0, who will be at   38 weeks and 0/7 days gestation based on a first trimester ultrasound   consistent with her last menstrual cycle.  The patient's estimated date of   delivery is .  She is currently an insulin-dependent gestational diabetic   with marginal control and recommendation for delivery was made after 38   weeks.  The patient is currently without complaints.  She reports good fetal   movement.  No loss of fluid, no contractions or vaginal bleeding.     PAST MEDICAL HISTORY:  The patient has opioid use disorder as well as   gestational diabetes class A2.     PAST SURGICAL HISTORY:  None.     MEDICATIONS:  The patient is currently taking 47 mg of methadone daily.  She   uses 24 units of Lantus at bedtime and 4 units of regular insulin with each   meal.     ALLERGIES:  No known drug allergies.     SOCIAL HISTORY:  The patient denies tobacco and ethanol, but does report a   history of IV heroin use.  She is currently on medical therapy at this time.     PERTINENT LABORATORY DATA:  Her blood type is O negative, antibody screen   negative and she received RhoGAM during pregnancy.  Rubella immune, HIV   negative, RPR nonreactive, hepatitis B and C are negative.  GBS is negative.     ASSESSMENT AND PLAN:  A 21-year-old  at 38 weeks' gestation, history of   opioid use disorder, currently stable on methadone and gestational diabetes   class A2 marginally controlled with insulin.  I discussed indications for   induction of labor with the patient who agrees to proceed.  We will start with   Cytotec or Pitocin as needed based on cervical exam.  The patient voiced   understanding of risks, benefits for induction of labor.        ______________________________  MD GENTRY Poe/KOM/PABLO    DD:  2021 17:29  DT:  2021 18:24    Job#:  [id number]

## 2021-11-05 NOTE — ANESTHESIA PROCEDURE NOTES
Epidural Block    Date/Time: 11/5/2021 6:44 AM  Performed by: Vito Atwood M.D.  Authorized by: Vito Atwood M.D.     Patient Location:  OB  Start Time:  11/5/2021 6:44 AM  End Time:  11/5/2021 6:58 AM  Reason for Block: labor analgesia    patient identified, IV checked, site marked, risks and benefits discussed, surgical consent, monitors and equipment checked, pre-op evaluation and timeout performed    Patient Position:  Sitting  Prep: ChloraPrep, patient draped and sterile technique    Monitoring:  Blood pressure, continuous pulse oximetry and heart rate  Approach:  Midline  Location:  L3-L4  Injection Technique:  CAR air  Skin infiltration:  Lidocaine  Strength:  1%  Dose:  3ml  Needle Type:  Tuohy  Needle Gauge:  17 G  Needle Length:  3.5 in  Loss of resistance::  6  Catheter Size:  19 G  Catheter at Skin Depth:  16  Test Dose Result:  Negative

## 2021-11-06 PROBLEM — O99.013 ANEMIA AFFECTING PREGNANCY IN THIRD TRIMESTER: Status: ACTIVE | Noted: 2021-11-06

## 2021-11-06 LAB
ERYTHROCYTE [DISTWIDTH] IN BLOOD BY AUTOMATED COUNT: 46.8 FL (ref 35.9–50)
HCT VFR BLD AUTO: 26.9 % (ref 37–47)
HGB BLD-MCNC: 7.7 G/DL (ref 12–16)
MCH RBC QN AUTO: 21.3 PG (ref 27–33)
MCHC RBC AUTO-ENTMCNC: 28.6 G/DL (ref 33.6–35)
MCV RBC AUTO: 74.3 FL (ref 81.4–97.8)
PLATELET # BLD AUTO: 152 K/UL (ref 164–446)
PMV BLD AUTO: 11.8 FL (ref 9–12.9)
RBC # BLD AUTO: 3.62 M/UL (ref 4.2–5.4)
WBC # BLD AUTO: 9.2 K/UL (ref 4.8–10.8)

## 2021-11-06 PROCEDURE — A9270 NON-COVERED ITEM OR SERVICE: HCPCS | Performed by: OBSTETRICS & GYNECOLOGY

## 2021-11-06 PROCEDURE — 85027 COMPLETE CBC AUTOMATED: CPT

## 2021-11-06 PROCEDURE — 36415 COLL VENOUS BLD VENIPUNCTURE: CPT

## 2021-11-06 PROCEDURE — 770002 HCHG ROOM/CARE - OB PRIVATE (112)

## 2021-11-06 PROCEDURE — 700102 HCHG RX REV CODE 250 W/ 637 OVERRIDE(OP): Performed by: OBSTETRICS & GYNECOLOGY

## 2021-11-06 RX ADMIN — LAMOTRIGINE 25 MG: 25 TABLET ORAL at 23:28

## 2021-11-06 RX ADMIN — LURASIDONE HYDROCHLORIDE 20 MG: 20 TABLET, FILM COATED ORAL at 19:02

## 2021-11-06 RX ADMIN — IBUPROFEN 600 MG: 600 TABLET ORAL at 23:28

## 2021-11-06 RX ADMIN — LAMOTRIGINE 25 MG: 25 TABLET ORAL at 00:23

## 2021-11-06 RX ADMIN — IBUPROFEN 600 MG: 600 TABLET ORAL at 17:19

## 2021-11-06 RX ADMIN — LAMOTRIGINE 25 MG: 25 TABLET ORAL at 14:14

## 2021-11-06 RX ADMIN — METHADONE HYDROCHLORIDE 47 MG: 10 CONCENTRATE ORAL at 09:38

## 2021-11-06 RX ADMIN — IBUPROFEN 600 MG: 600 TABLET ORAL at 08:39

## 2021-11-06 ASSESSMENT — EDINBURGH POSTNATAL DEPRESSION SCALE (EPDS)
I HAVE BEEN SO UNHAPPY THAT I HAVE HAD DIFFICULTY SLEEPING: NOT VERY OFTEN
I HAVE FELT SAD OR MISERABLE: NO, NOT AT ALL
I HAVE BEEN ANXIOUS OR WORRIED FOR NO GOOD REASON: YES, SOMETIMES
I HAVE LOOKED FORWARD WITH ENJOYMENT TO THINGS: AS MUCH AS I EVER DID
THE THOUGHT OF HARMING MYSELF HAS OCCURRED TO ME: NEVER
I HAVE BEEN ABLE TO LAUGH AND SEE THE FUNNY SIDE OF THINGS: AS MUCH AS I ALWAYS COULD
THINGS HAVE BEEN GETTING ON TOP OF ME: NO, MOST OF THE TIME I HAVE COPED QUITE WELL
I HAVE BEEN SO UNHAPPY THAT I HAVE BEEN CRYING: NO, NEVER
I HAVE BLAMED MYSELF UNNECESSARILY WHEN THINGS WENT WRONG: NO, NEVER
I HAVE FELT SCARED OR PANICKY FOR NO GOOD REASON: NO, NOT MUCH

## 2021-11-06 NOTE — LACTATION NOTE
This note was copied from a baby's chart.  Baby 38.1 weeks, , Nandini+, MOB Hx Anxiety/Depression, taking Methadone, GDM. MOB reports she has not been able to latch baby, she has been pumping & feeding back, yield of 5-8 ml's. MOB's breasts assessed, nipples inverted bilaterally, initiated NS 20 mm. LC provided demo on how to apply, clean & use NS. Mother reports baby just fed, latch not seen at this time, baby asleep in crib. Initiated 3 step plan:    3 step plan:  1. Breastfeed   2. Supplement according to guideline volumes 10-20-30 - given  3. Pump & hand express  Every 3-4 hours or sooner if baby cues, feed a minimum 8x/24 hours    Information sheets provided with review:  1. Storage & Prep of BM, CDC  2. HG Pump rental   3. Breastfeeding Support/Zoom  4. NNBC resource sheet

## 2021-11-06 NOTE — DISCHARGE PLANNING
Discharge Planning Assessment Post Partum    Completed chart review and discussed with team.    Reason for Referral: Maternal history of depression/anxiety. Mother on methadone.   Address: [facility] [address] In [place]  Type of Living Situation: home  Mom Diagnosis: post partum  Baby Diagnosis:   Primary Language: english    Name of Baby: Jerson Cloud  Father of the Baby: Joce Cloud  Involved in baby’s care? yes  Contact Information: [phone]    Prenatal Care: yes  Mom's PCP: none  PCP for new baby:[facility]    Support System: family - parents of infant live with mother's grandmother  Coping/Bonding between mother & baby: appropriate per RN  Source of Feeding: breast - discussed methadone use with team.  Supplies for Infant: prepared    Mom's Insurance: [employer]  Baby Covered on Insurance:yes  Mother Employed/School: no  Other children in the home/names & ages: no    Financial Hardship/Income: denies   Mom's Mental status:  Alert and oriented  Services used prior to admit: Methadone clinic [facility]. Mental health support -     CPS History: denies  Psychiatric History: mother admits to depression and anxiety. She denies current symptoms and is taking Lamictal with good results  Domestic Violence History: denies  Drug/ETOH History: mother states she spent a weekend with friends using OxyContin that was laced with Fentnyl. She was not aware of the Fentnyl. She states she has withdrawal after the weekend of use and went to [employer] for assistance. She takes 47 mg methadone daily. She admits to marijuana use during pregnancy for hyperemesis. She states this is the only thing that helped her. She discussed this with her physician. She is aware of infant's positive UDS. [facility]    Resources Provided: community resources, post partum support  Referrals Made: diaper bank. [facility]. [facility] if [facility] will [facility]     Clearance for  Discharge: infant [facility] not [facility]     Ongoing Plan: Await [facility].

## 2021-11-06 NOTE — PROGRESS NOTES
Progress Note    Madyson Ocampo    day 1  Chief Admitting Dx: Labor and delivery indication for care or intervention [O75.9]  Delivery Type: vaginal, spontaneous      Subjective:  Ambulating: yes  Tolerating oral feed: yes  Flatus: yes    Voiding: yes  Dizziness: yes  Vitals:    11/05/21 1245 11/05/21 1800 11/05/21 2200 11/06/21 0200   BP: 132/78 (!) 97/57 (!) 93/48 112/62   Pulse: 71 82 65 70   Resp: 18 18 16 16   Temp: 37.1 °C (98.7 °F) 36.3 °C (97.4 °F) 37.1 °C (98.8 °F) 37.7 °C (99.9 °F)   TempSrc: Temporal Temporal Temporal Temporal   SpO2: 100% 97% 100% 98%   Weight:       Height:           Exam:  Abdomen: Abdomen soft, non-tender. BS normal. No masses,  No organomegaly  Incision: none  Fundus Tenderness: no  Below umbilicus: yes  Perineum: perineum intact  Extremities: Normal  Lochia: mild    Labs:   Recent Results (from the past 24 hour(s))   POCT glucose device results    Collection Time: 11/05/21  7:37 AM   Result Value Ref Range    Glucose - Accu-Ck 86 65 - 99 mg/dL   MOM RHHDN/RHOGAM    Collection Time: 11/05/21  6:50 PM   Result Value Ref Range    Immune [place] Test NEG     Number Of Rh Doses Indicated 1    ACTION: RH IMMUNE GLOBULIN    Collection Time: 11/05/21  6:50 PM   Result Value Ref Range    Action  Rh Immune Globulin [id number]       issued       1 Syrng    CBC without differential    Collection Time: 11/06/21  4:39 AM   Result Value Ref Range    WBC 9.2 4.8 - 10.8 K/uL    RBC 3.62 (L) 4.20 - 5.40 M/uL    Hemoglobin 7.7 (L) 12.0 - 16.0 g/dL    Hematocrit 26.9 (L) 37.0 - 47.0 %    MCV 74.3 (L) 81.4 - 97.8 fL    MCH 21.3 (L) 27.0 - 33.0 pg    MCHC 28.6 (L) 33.6 - 35.0 g/dL    RDW 46.8 35.9 - 50.0 fL    Platelet Count 152 (L) 164 - 446 K/uL    MPV 11.8 9.0 - 12.9 fL       Assessment:  Continue Routine postpartum care      Plan:  Advance Diet, Encourange Ambulation and Consider Discharge 24h-48 hours    Alaina Beauchamp M.D.

## 2021-11-06 NOTE — PROGRESS NOTES
Patient arrived to room 311, report received.  Pt denies pain at this time. VSS. Light Lochia, fundus firm.  Up in WC to see baby in nbn. Will monitor.

## 2021-11-06 NOTE — CARE PLAN
The patient is stable at this time. Low risk of patient declining or worsening    Shift Goals  Clinical Goals: rest, pain control, stable V/S, breastfeeding support    Progress made toward(s) clinical / shift goals:  inverted nipples, patient started pumping    Patient is not progressing towards the following goals: N/A  Problem: Pain - Standard  Goal: Alleviation of pain or a reduction in pain to the patient’s comfort goal  Outcome: Progressing  Note: Pain control at this time. Will be medicated as needed.      Problem: Altered Physiologic Condition  Goal: Patient physiologically stable as evidenced by normal lochia, palpable uterine involution and vitals within normal limits  Outcome: Progressing  Note: Fundus firm at U, lochia rubra minimal. VSS

## 2021-11-07 ENCOUNTER — PHARMACY VISIT (OUTPATIENT)
Dept: PHARMACY | Facility: MEDICAL CENTER | Age: 21
End: 2021-11-07
Payer: MEDICARE

## 2021-11-07 VITALS
RESPIRATION RATE: 17 BRPM | HEIGHT: 62 IN | SYSTOLIC BLOOD PRESSURE: 110 MMHG | WEIGHT: 90 LBS | TEMPERATURE: 98.1 F | HEART RATE: 78 BPM | DIASTOLIC BLOOD PRESSURE: 73 MMHG | OXYGEN SATURATION: 100 % | BODY MASS INDEX: 16.56 KG/M2

## 2021-11-07 LAB
APPEARANCE UR: ABNORMAL
BACTERIA #/AREA URNS HPF: ABNORMAL /HPF
BILIRUB UR QL STRIP.AUTO: NEGATIVE
COLOR UR: YELLOW
EPI CELLS #/AREA URNS HPF: NEGATIVE /HPF
GLUCOSE UR STRIP.AUTO-MCNC: NEGATIVE MG/DL
KETONES UR STRIP.AUTO-MCNC: NEGATIVE MG/DL
LEUKOCYTE ESTERASE UR QL STRIP.AUTO: ABNORMAL
MICRO URNS: ABNORMAL
NITRITE UR QL STRIP.AUTO: NEGATIVE
PH UR STRIP.AUTO: 7 [PH] (ref 5–8)
PROT UR QL STRIP: NEGATIVE MG/DL
RBC # URNS HPF: ABNORMAL /HPF
RBC UR QL AUTO: NEGATIVE
SP GR UR STRIP.AUTO: 1.02
UROBILINOGEN UR STRIP.AUTO-MCNC: 1 MG/DL
WBC #/AREA URNS HPF: ABNORMAL /HPF

## 2021-11-07 PROCEDURE — 700102 HCHG RX REV CODE 250 W/ 637 OVERRIDE(OP): Performed by: OBSTETRICS & GYNECOLOGY

## 2021-11-07 PROCEDURE — RXMED WILLOW AMBULATORY MEDICATION CHARGE: Performed by: OBSTETRICS & GYNECOLOGY

## 2021-11-07 PROCEDURE — 87077 CULTURE AEROBIC IDENTIFY: CPT

## 2021-11-07 PROCEDURE — A9270 NON-COVERED ITEM OR SERVICE: HCPCS | Performed by: OBSTETRICS & GYNECOLOGY

## 2021-11-07 PROCEDURE — 87086 URINE CULTURE/COLONY COUNT: CPT

## 2021-11-07 PROCEDURE — 81001 URINALYSIS AUTO W/SCOPE: CPT

## 2021-11-07 PROCEDURE — 87186 SC STD MICRODIL/AGAR DIL: CPT

## 2021-11-07 RX ORDER — CIPROFLOXACIN 500 MG/1
500 TABLET, FILM COATED ORAL DAILY
Qty: 3 TABLET | Refills: 0 | Status: SHIPPED | OUTPATIENT
Start: 2021-11-07 | End: 2021-11-13

## 2021-11-07 RX ORDER — IBUPROFEN 600 MG/1
600 TABLET ORAL EVERY 6 HOURS PRN
Qty: 30 TABLET | Refills: 0 | Status: SHIPPED | OUTPATIENT
Start: 2021-11-07

## 2021-11-07 RX ORDER — FERROUS SULFATE 325(65) MG
325 TABLET ORAL 2 TIMES DAILY
Qty: 60 TABLET | Refills: 0 | Status: SHIPPED | OUTPATIENT
Start: 2021-11-07

## 2021-11-07 RX ORDER — CIPROFLOXACIN 500 MG/1
500 TABLET, FILM COATED ORAL ONCE
Status: COMPLETED | OUTPATIENT
Start: 2021-11-07 | End: 2021-11-07

## 2021-11-07 RX ADMIN — IBUPROFEN 600 MG: 600 TABLET ORAL at 08:28

## 2021-11-07 RX ADMIN — IBUPROFEN 600 MG: 600 TABLET ORAL at 18:41

## 2021-11-07 RX ADMIN — LURASIDONE HYDROCHLORIDE 20 MG: 20 TABLET, FILM COATED ORAL at 18:41

## 2021-11-07 RX ADMIN — METHADONE HYDROCHLORIDE 47 MG: 10 CONCENTRATE ORAL at 09:35

## 2021-11-07 RX ADMIN — CIPROFLOXACIN 500 MG: 500 TABLET, FILM COATED ORAL at 19:32

## 2021-11-07 RX ADMIN — LAMOTRIGINE 25 MG: 25 TABLET ORAL at 12:07

## 2021-11-07 ASSESSMENT — PAIN DESCRIPTION - PAIN TYPE
TYPE: ACUTE PAIN

## 2021-11-07 NOTE — PROGRESS NOTES
Patient with recurrent UTI and symptomatic currently. Reports she was diagnosed with a UTI two days prior to her IOL but no other medications work as well as cipro.  Asking for RX for cipro. Reviewed breast feeding data. RX sent. Urine to culture

## 2021-11-07 NOTE — PROGRESS NOTES
0800: Assessment completed, fundus firm, lochia light. Plan of care reviewed.Pain controlled with PRN meds and heating pad. Will call if additional intervention needed.

## 2021-11-07 NOTE — LACTATION NOTE
This note was copied from a baby's chart.  Follow up:    Following 3 step plan.  Pump settings reviewed.  Today baby is taking 30ml of supplementation every 2-3 hours (18ml breastmilk and 12ml formula) and giving via slow paced bottle feeding. Recommend to increase supplement to 40ml every 2-3 hours.   Following all pumping sessions with hand expression.  Baby tolerating feeds well.  MOB states that baby sometimes will latch at breast and sometimes will not.  Instructed to call RN or LC with next feeding for assistance.    Denies any needs or questions.

## 2021-11-07 NOTE — CARE PLAN
The patient is Stable - Low risk of patient condition declining or worsening    Shift Goals  Clinical Goals: maintain stable vital signs    Progress made toward(s) clinical / shift goals:      Problem: Knowledge Deficit - Postpartum  Goal: Patient will verbalize and demonstrate understanding of self and infant care  Outcome: Progressing  Note: Patient demonstrates appropriate care for infant and self     Problem: Infection - Postpartum  Goal: Postpartum patient will be free of signs and symptoms of infection  Outcome: Progressing  Note: Patient does not exhibit any signs or symptoms of infection

## 2021-11-07 NOTE — PROGRESS NOTES
0815: Assessment completed, fundus firm, lochia light. Plan of care reviewed. Denies pain at this time, will call if intervention needed.

## 2021-11-07 NOTE — PROGRESS NOTES
Bedside report done, patient in bed feeding infant. Denies pain at this time. Will continue to monitor.

## 2021-11-07 NOTE — CARE PLAN
The patient is stable. Low risk of patient declining or worsening     Shift Goals  Clinical Goals: rest, pain  control, maintain stable V/S    Progress made toward(s) clinical / shift goals:    Problem: Pain  Goal: Patient's pain will be alleviated or reduced to the patient’s comfort goal  Outcome: Progressing  Note: Pain control at this time. Will be medicated as needed.     Problem: Altered Physiologic Condition  Goal: Patient physiologically stable as evidenced by normal lochia, palpable uterine involution and vitals within normal limits  Outcome: Progressing  Note: Fundus firm at U, lochia rubra minimal. VSS       Patient is not progressing towards the following goals:

## 2021-11-08 ENCOUNTER — DOCUMENTATION (OUTPATIENT)
Dept: OBGYN | Facility: CLINIC | Age: 21
End: 2021-11-08

## 2021-11-08 DIAGNOSIS — F31.9 BIPOLAR 1 DISORDER (HCC): ICD-10-CM

## 2021-11-08 PROCEDURE — RXMED WILLOW AMBULATORY MEDICATION CHARGE: Performed by: OBSTETRICS & GYNECOLOGY

## 2021-11-08 NOTE — PROGRESS NOTES
"1400 Patient indicated having a chronic UTI throughout pregnancy and requesting Cipro because its what patient indicated it works for her. Cipro and lactation information from \"[facility] Medications and Mother Milk\" book given. After reviewing information patient continues to request Cipro.  1430 MD Aragon called and notified of patient status and cipro request. Cipro ordered and ua/urine culture order, patient may be discharged after collection.  1840 Discharge instructions reviewed, verbalized understanding, papers signed. Prescribed med and information given, reviewed, verbalized understanding.     "

## 2021-11-08 NOTE — DISCHARGE INSTRUCTIONS
PATIENT DISCHARGE EDUCATION INSTRUCTION SHEET  REASONS TO CALL YOUR OBSTETRICIAN  · Persistent fever, shaking, chills (Temperature higher than 100.4) may indicate you have an infection  · Heavy bleeding: soaking more than 1 pad per hour; Passing clots an egg-sized clot or bigger may mean you have an postpartum hemorrhage  · Foul odor from vagina or bad smelling or discolored discharge or blood  · Breast infection (Mastitis symptoms); breast pain, chills, fever, redness or red streaks, may feel flu like symptoms  · Urinary pain, burning or frequency  · Incision that is not healing, increased redness, swelling, tenderness or pain, or any pus from episiotomy or  site may mean you have an infection  · Redness, swelling, warmth, or painful to touch in the calf area of your leg may mean you have a blood clot  · Severe or intensified depression, thoughts or feelings of wanting to hurt yourself or someone else   · Pain in chest, obstructed breathing or shortness of breath (trouble catching your breath) may mean you are having a postpartum complication. Call your provider immediately   · Headache that does not get better, even after taking medicine, a bad headache with vision changes or pain in the upper right area of your belly may mean you have high blood pressure or post birth preeclampsia. Call your provider immediately    HAND WASHING  All family and friends should wash their hands:  · Before and after holding the baby  · Before feeding the baby  · After using the restroom or changing the baby's diaper    WOUND CARE  Ask your physician for additional care instructions. In general:  · Episiotomy/Laceration  · May use ernst-spray bottle, witch hazel pads and dermaplast spray for comfort  · Use ernst-spray bottle after urinating to cleanse perineal area  · To prevent burning during urination spray ernst-water bottle on labial area   · Pat perineal area dry until episiotomy/laceration is healed  · Continue to use  ernst-bottle until bleeding stops as needed  · If have a 2nd degree laceration or greater, a Sitz bath can offer relief from soreness, burning, and inflammation   · Sitz Bath   · Sit in 6 inches of warm water and soak laceration as needed until the laceration heals    VAGINAL CARE AND BLEEDING  · Nothing inside vagina for 6 weeks:   · No sexual intercourse, tampons or douching  · Bleeding may continue for 2-4 weeks. Amount and color may vary  · Soaking 1 pad or more in an hour for several hours is considered heavy bleeding  · Passing large egg sized blood clots can be concerning  · If you feel like you have heavy bleeding or are having increasing amount of blood clots call your Obstetrician immediately  · If you begin feeling faint upon standing, feeling sick to your stomach, have clammy skin, a really fast heartbeat, have chills, start feeling confused, dizzy, sleepy or weak, or feeling like you're going to faint call your Obstetrician immediately    HYPERTENSION   Preeclampsia or gestational hypertension are types of high blood pressure that only pregnant women can get. It is important for you to be aware of symptoms to seek early intervention and treatment. If you have any of these symptoms immediately call your Obstetrician    · Vision changes or blurred vision   · Severe headache or pain that is unrelieved with medication and will not go away  · Persistent pain in upper abdomen or shoulder   · Increased swelling of face, feet, or hands  · Difficulty breathing or shortness of breath at rest  · Urinating less than usual    URINATION AND BOWEL MOVEMENTS  · Eating more fiber (bran cereal, fruits, and vegetables) and drinking plenty of fluids will help to avoid constipation  · Urinary frequency and urgency after childbirth is normal  · If you experience any urinary pain, burning or frequency call your provider    BIRTH CONTROL  · It is possible to become pregnant at any time after delivery and while  "breastfeeding  · Plan to discuss a method of birth control with your physician at your post delivery follow up visit    POSTPARTUM BLUES  During the first few days after birth, you may experience a sense of the \"blues\" which may include impatience, irritability or even crying. These feelings come and go quickly. However, as many as 1 in 10 women experience emotional symptoms known as postpartum depression.     POSTPARTUM DEPRESSION    May start as early as the second or third day after delivery or take several weeks or months to develop. Symptoms of \"blues\" are present, but are more intense: Crying spells; loss of appetite; feelings of hopelessness or loss of control; fear of touching the baby; over concern or no concern at all about the baby; little or no concern about your own appearance/caring for yourself; and/or inability to sleep or excessive sleeping. Contact your Obstetrician if you are experiencing any of these symptoms     PREVENTING SHAKEN BABY  If you are angry or stressed, PUT THE BABY IN THE CRIB, step away, take some deep breaths, and wait until you are calm to care for the baby. DO NOT SHAKE THE BABY. You are not alone, call a supporter for help.  · [facility] 24/7 crisis call line (830-632-8404) or (1-256.815.4481)  · You can also text them, text \"ANSWER\" (586324)      "
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
## 2021-11-10 ENCOUNTER — PHARMACY VISIT (OUTPATIENT)
Dept: PHARMACY | Facility: MEDICAL CENTER | Age: 21
End: 2021-11-10
Payer: MEDICARE

## 2021-11-10 NOTE — DISCHARGE PLANNING
:    Notified infant is ready for discharge.  SW contacted [facility] and spoke with Sandra Fofana who stated the report is information only.  Infant is cleared to discharge home with parents.      A ALBERTO referral was made through [facility].

## 2022-06-27 ENCOUNTER — HOSPITAL ENCOUNTER (EMERGENCY)
Facility: MEDICAL CENTER | Age: 22
End: 2022-06-27
Payer: COMMERCIAL

## 2022-06-27 VITALS
DIASTOLIC BLOOD PRESSURE: 63 MMHG | OXYGEN SATURATION: 98 % | WEIGHT: 99.87 LBS | HEART RATE: 66 BPM | RESPIRATION RATE: 18 BRPM | TEMPERATURE: 98 F | BODY MASS INDEX: 18.27 KG/M2 | SYSTOLIC BLOOD PRESSURE: 111 MMHG

## 2022-06-27 PROCEDURE — 302449 STATCHG TRIAGE ONLY (STATISTIC)

## 2022-06-27 ASSESSMENT — FIBROSIS 4 INDEX: FIB4 SCORE: 0.88

## 2022-06-27 NOTE — ED TRIAGE NOTES
.  Chief Complaint   Patient presents with   • Medication Refill     Patient was late to her methadone appointment, and was not able to get her methadone refilled. Reports she is currently withdrawing.        22 yo female ambulatory to triage for above complaint. Called peer support, patient will be given resources in the lobby.     Pt is alert and oriented, speaking in full sentences, follows commands and responds appropriately to questions.      Patient placed back in lobby and educated on triage process. Asked to inform RN of any changes.    /63   Pulse 66   Temp 36.7 °C (98 °F) (Temporal)   Resp 18   Wt 45.3 kg (99 lb 13.9 oz)   SpO2 98%   BMI 18.27 kg/m²

## 2022-10-12 ENCOUNTER — HOSPITAL ENCOUNTER (EMERGENCY)
Facility: MEDICAL CENTER | Age: 22
End: 2022-10-12
Attending: EMERGENCY MEDICINE
Payer: MEDICAID

## 2022-10-12 ENCOUNTER — PHARMACY VISIT (OUTPATIENT)
Dept: PHARMACY | Facility: MEDICAL CENTER | Age: 22
End: 2022-10-12
Payer: COMMERCIAL

## 2022-10-12 VITALS
OXYGEN SATURATION: 97 % | DIASTOLIC BLOOD PRESSURE: 69 MMHG | RESPIRATION RATE: 16 BRPM | HEART RATE: 65 BPM | BODY MASS INDEX: 14.53 KG/M2 | WEIGHT: 82 LBS | SYSTOLIC BLOOD PRESSURE: 110 MMHG | HEIGHT: 63 IN | TEMPERATURE: 98 F

## 2022-10-12 DIAGNOSIS — F41.0 PANIC ATTACK: ICD-10-CM

## 2022-10-12 DIAGNOSIS — R11.0 NAUSEA: ICD-10-CM

## 2022-10-12 PROCEDURE — A9270 NON-COVERED ITEM OR SERVICE: HCPCS | Performed by: EMERGENCY MEDICINE

## 2022-10-12 PROCEDURE — 700102 HCHG RX REV CODE 250 W/ 637 OVERRIDE(OP): Performed by: EMERGENCY MEDICINE

## 2022-10-12 PROCEDURE — RXMED WILLOW AMBULATORY MEDICATION CHARGE: Performed by: EMERGENCY MEDICINE

## 2022-10-12 PROCEDURE — 99283 EMERGENCY DEPT VISIT LOW MDM: CPT

## 2022-10-12 RX ORDER — ONDANSETRON 4 MG/1
4 TABLET, ORALLY DISINTEGRATING ORAL EVERY 6 HOURS PRN
Qty: 10 TABLET | Refills: 0 | Status: SHIPPED | OUTPATIENT
Start: 2022-10-12

## 2022-10-12 RX ADMIN — LIDOCAINE HYDROCHLORIDE 15 ML: 20 SOLUTION OROPHARYNGEAL at 15:35

## 2022-10-12 ASSESSMENT — FIBROSIS 4 INDEX: FIB4 SCORE: 0.92

## 2022-10-12 NOTE — ED PROVIDER NOTES
"ED Provider Note    CHIEF COMPLAINT  Chief Complaint   Patient presents with    Nausea     Pt was discharged from [facility] last night, pt was sent home with ABX and reports to it causing her nausea. Pt did not eat prior to taking her abx.        HPI  Madyson Ocampo is a 22 y.o. female who presents to the emergency department with primary complaint of nausea.  Recently started on doxycycline for reported pneumonia as diagnosis [place].  Today had onset of nausea which then caused increased anxiety and ultimately bring her here to the ER.  Currently she states that she is somewhat better.  But reluctant to take the antibiotics at this point.    REVIEW OF SYSTEMS  See HPI for further details. All other systems are negative.     PAST MEDICAL HISTORY       SOCIAL HISTORY  Social History     Tobacco Use    Smoking status: Never    Smokeless tobacco: Never   Vaping Use    Vaping Use: Never used   Substance and Sexual Activity    Alcohol use: No    Drug use: No    Sexual activity: Yes     Partners: Male       SURGICAL HISTORY  patient denies any surgical history    CURRENT MEDICATIONS  Home Medications       Reviewed by Jes Carrillo R.N. (Registered Nurse) on 10/12/22 at 1420  Med List Status: Partial     Medication Last Dose Status   BD PEN NEEDLE HARSH 2ND GEN  Active   ferrous sulfate 325 (65 Fe) MG tablet  Active   ibuprofen (MOTRIN) 600 MG Tab  Active   lamoTRIgine (LAMICTAL) 25 MG Tab  Active   Lancets Ultra Thin 30G Misc  Active   LATUDA 40 MG Tab  Active                    ALLERGIES  No Known Allergies    PHYSICAL EXAM  VITAL SIGNS: /69   Pulse 65   Temp 36.7 °C (98 °F) (Temporal)   Resp 16   Ht 1.6 m (5' 3\")   Wt 37.2 kg (82 lb)   LMP 09/21/2022 (Approximate)   SpO2 97%   BMI 14.53 kg/m²  @RUTHY[[id number]::@  Pulse ox interpretation: I interpret this pulse ox as normal.  Constitutional: Alert in no apparent distress.  HENT: Normocephalic, Atraumatic, Bilateral external ears normal. Nose " normal.   Eyes: Pupils are equal and reactive.  Heart: Regular rate and rythm, no murmurs.    Lungs: Clear to auscultation bilaterally.  Skin: Warm, Dry, No erythema, No rash.   Neurologic: Alert, Grossly non-focal.   Psychiatric: Affect is anxious, Judgment normal, Mood normal, Appears appropriate and not intoxicated.           COURSE & MEDICAL DECISION MAKING  Pertinent Labs & Imaging studies reviewed. (See chart for details)    22-year-old female presented to the emergency department with above presentation.  I believe that the primary etiology of the multitude of her symptoms is due to her underlying anxiety.  However prompting this may be some GI upset secondary to the starting of her antibiotic (doxycycline).  I have given her GI cocktail here in the ER and have additionally prescribed her Zofran for home.  She is understanding of ongoing instructions for use of antibiotic at home.  She will return to the ER with any change or worsening.     The patient will return for worsening symptoms and is stable at the time of discharge. The patient verbalizes understanding and will comply.    FINAL IMPRESSION  1. Nausea    2. Panic attack               Electronically signed by: Troy Anglin M.D., 10/12/2022 10:11 PM

## 2022-10-12 NOTE — ED NOTES
Pt discharged to home. Pt was given follow up instructions and prescriptions for Zofran. Pt verbalized understanding of all instructions for discharge and is ambulatory out of ED with steady gait. AOx4

## 2022-10-12 NOTE — ED TRIAGE NOTES
"Madyson Ocampo  22 y.o.    Chief Complaint   Patient presents with    Nausea     Pt was discharged from [facility] last night, pt was sent home with ABX and reports to it causing her nausea. Pt did not eat prior to taking her abx.        Blood Pressure: 104/64, Pulse: 66, Respiration: 16, Temperature: 36.2 °C (97.2 °F), Height: 160 cm (5' 3\"), Weight: 37.2 kg (82 lb), BMI (Calculated): 14.53, BSA (Calculated): 1.3, Pulse Oximetry: 96 %    Pt placed in the lobby, pt educated to notify staff of any changes or concerns.   "

## 2023-01-16 ENCOUNTER — APPOINTMENT (OUTPATIENT)
Dept: RADIOLOGY | Facility: MEDICAL CENTER | Age: 23
End: 2023-01-16
Attending: EMERGENCY MEDICINE
Payer: MEDICAID

## 2023-01-16 ENCOUNTER — HOSPITAL ENCOUNTER (EMERGENCY)
Facility: MEDICAL CENTER | Age: 23
End: 2023-01-16
Attending: EMERGENCY MEDICINE
Payer: MEDICAID

## 2023-01-16 VITALS
BODY MASS INDEX: 15.31 KG/M2 | TEMPERATURE: 98.4 F | RESPIRATION RATE: 16 BRPM | HEIGHT: 63 IN | WEIGHT: 86.42 LBS | DIASTOLIC BLOOD PRESSURE: 62 MMHG | SYSTOLIC BLOOD PRESSURE: 100 MMHG | HEART RATE: 81 BPM | OXYGEN SATURATION: 98 %

## 2023-01-16 DIAGNOSIS — R82.71 ASYMPTOMATIC BACTERIURIA OF PREGNANCY: ICD-10-CM

## 2023-01-16 DIAGNOSIS — O99.891 ASYMPTOMATIC BACTERIURIA OF PREGNANCY: ICD-10-CM

## 2023-01-16 DIAGNOSIS — R00.2 PALPITATIONS: ICD-10-CM

## 2023-01-16 LAB
ALBUMIN SERPL BCP-MCNC: 4.3 G/DL (ref 3.2–4.9)
ALBUMIN/GLOB SERPL: 1.9 G/DL
ALP SERPL-CCNC: 58 U/L (ref 30–99)
ALT SERPL-CCNC: 11 U/L (ref 2–50)
ANION GAP SERPL CALC-SCNC: 12 MMOL/L (ref 7–16)
APPEARANCE UR: CLEAR
AST SERPL-CCNC: 17 U/L (ref 12–45)
BACTERIA #/AREA URNS HPF: ABNORMAL /HPF
BASOPHILS # BLD AUTO: 0.2 % (ref 0–1.8)
BASOPHILS # BLD: 0.02 K/UL (ref 0–0.12)
BILIRUB SERPL-MCNC: 0.4 MG/DL (ref 0.1–1.5)
BILIRUB UR QL STRIP.AUTO: NEGATIVE
BUN SERPL-MCNC: 10 MG/DL (ref 8–22)
CALCIUM ALBUM COR SERPL-MCNC: 9.4 MG/DL (ref 8.5–10.5)
CALCIUM SERPL-MCNC: 9.6 MG/DL (ref 8.5–10.5)
CHLORIDE SERPL-SCNC: 108 MMOL/L (ref 96–112)
CO2 SERPL-SCNC: 23 MMOL/L (ref 20–33)
COLOR UR: ABNORMAL
CREAT SERPL-MCNC: 0.42 MG/DL (ref 0.5–1.4)
EKG IMPRESSION: NORMAL
EOSINOPHIL # BLD AUTO: 0.03 K/UL (ref 0–0.51)
EOSINOPHIL NFR BLD: 0.2 % (ref 0–6.9)
EPI CELLS #/AREA URNS HPF: ABNORMAL /HPF
ERYTHROCYTE [DISTWIDTH] IN BLOOD BY AUTOMATED COUNT: 41.1 FL (ref 35.9–50)
GFR SERPLBLD CREATININE-BSD FMLA CKD-EPI: 141 ML/MIN/1.73 M 2
GLOBULIN SER CALC-MCNC: 2.3 G/DL (ref 1.9–3.5)
GLUCOSE SERPL-MCNC: 98 MG/DL (ref 65–99)
GLUCOSE UR STRIP.AUTO-MCNC: NEGATIVE MG/DL
HCT VFR BLD AUTO: 33.8 % (ref 37–47)
HGB BLD-MCNC: 11.3 G/DL (ref 12–16)
HYALINE CASTS #/AREA URNS LPF: ABNORMAL /LPF
IMM GRANULOCYTES # BLD AUTO: 0.03 K/UL (ref 0–0.11)
IMM GRANULOCYTES NFR BLD AUTO: 0.2 % (ref 0–0.9)
KETONES UR STRIP.AUTO-MCNC: ABNORMAL MG/DL
LEUKOCYTE ESTERASE UR QL STRIP.AUTO: NEGATIVE
LYMPHOCYTES # BLD AUTO: 2.2 K/UL (ref 1–4.8)
LYMPHOCYTES NFR BLD: 17.3 % (ref 22–41)
MCH RBC QN AUTO: 27.9 PG (ref 27–33)
MCHC RBC AUTO-ENTMCNC: 33.4 G/DL (ref 33.6–35)
MCV RBC AUTO: 83.5 FL (ref 81.4–97.8)
MICRO URNS: ABNORMAL
MONOCYTES # BLD AUTO: 0.54 K/UL (ref 0–0.85)
MONOCYTES NFR BLD AUTO: 4.2 % (ref 0–13.4)
MUCOUS THREADS #/AREA URNS HPF: ABNORMAL /HPF
NEUTROPHILS # BLD AUTO: 9.91 K/UL (ref 2–7.15)
NEUTROPHILS NFR BLD: 77.9 % (ref 44–72)
NITRITE UR QL STRIP.AUTO: NEGATIVE
NRBC # BLD AUTO: 0 K/UL
NRBC BLD-RTO: 0 /100 WBC
PH UR STRIP.AUTO: 5.5 [PH] (ref 5–8)
PLATELET # BLD AUTO: 169 K/UL (ref 164–446)
PMV BLD AUTO: 10 FL (ref 9–12.9)
POTASSIUM SERPL-SCNC: 3.6 MMOL/L (ref 3.6–5.5)
PROT SERPL-MCNC: 6.6 G/DL (ref 6–8.2)
PROT UR QL STRIP: 30 MG/DL
RBC # BLD AUTO: 4.05 M/UL (ref 4.2–5.4)
RBC # URNS HPF: ABNORMAL /HPF
RBC UR QL AUTO: NEGATIVE
SODIUM SERPL-SCNC: 143 MMOL/L (ref 135–145)
SP GR UR STRIP.AUTO: 1.03
TROPONIN T SERPL-MCNC: <6 NG/L (ref 6–19)
UROBILINOGEN UR STRIP.AUTO-MCNC: 0.2 MG/DL
WBC # BLD AUTO: 12.7 K/UL (ref 4.8–10.8)
WBC #/AREA URNS HPF: ABNORMAL /HPF

## 2023-01-16 PROCEDURE — 36415 COLL VENOUS BLD VENIPUNCTURE: CPT

## 2023-01-16 PROCEDURE — 84484 ASSAY OF TROPONIN QUANT: CPT

## 2023-01-16 PROCEDURE — 71046 X-RAY EXAM CHEST 2 VIEWS: CPT

## 2023-01-16 PROCEDURE — 80053 COMPREHEN METABOLIC PANEL: CPT

## 2023-01-16 PROCEDURE — 81001 URINALYSIS AUTO W/SCOPE: CPT

## 2023-01-16 PROCEDURE — 93005 ELECTROCARDIOGRAM TRACING: CPT | Performed by: EMERGENCY MEDICINE

## 2023-01-16 PROCEDURE — 93005 ELECTROCARDIOGRAM TRACING: CPT

## 2023-01-16 PROCEDURE — 700102 HCHG RX REV CODE 250 W/ 637 OVERRIDE(OP): Performed by: EMERGENCY MEDICINE

## 2023-01-16 PROCEDURE — A9270 NON-COVERED ITEM OR SERVICE: HCPCS | Performed by: EMERGENCY MEDICINE

## 2023-01-16 PROCEDURE — 99285 EMERGENCY DEPT VISIT HI MDM: CPT

## 2023-01-16 PROCEDURE — 85025 COMPLETE CBC W/AUTO DIFF WBC: CPT

## 2023-01-16 RX ORDER — CEPHALEXIN 500 MG/1
500 CAPSULE ORAL ONCE
Status: COMPLETED | OUTPATIENT
Start: 2023-01-16 | End: 2023-01-16

## 2023-01-16 RX ORDER — CEPHALEXIN 500 MG/1
500 CAPSULE ORAL 3 TIMES DAILY
Qty: 15 CAPSULE | Refills: 0 | Status: ACTIVE | OUTPATIENT
Start: 2023-01-16 | End: 2023-01-21

## 2023-01-16 RX ADMIN — CEPHALEXIN 500 MG: 500 CAPSULE ORAL at 19:29

## 2023-01-16 ASSESSMENT — FIBROSIS 4 INDEX: FIB4 SCORE: 1.35

## 2023-01-17 NOTE — ED TRIAGE NOTES
"Chief Complaint   Patient presents with    Pregnancy     Approx 10 weeks pregnant. Has had an ultrasound    Drug Withdrawal     Pt is prescribed methadone, pt has been vomiting for the last days. Pt feels like she's going through withdrawal    Irregular Heart Beat     States she feels like her HR is racing then \"drops\"     This is pt's third pregnancy with one living son. Hx of hyperemesis during her last pregnancy.    /69   Pulse 79   Temp (!) 38.1 °C (100.5 °F) (Temporal)   Resp 18   Ht 1.6 m (5' 3\")   Wt 39.2 kg (86 lb 6.7 oz)   LMP 09/21/2022 (Approximate)   SpO2 98%   BMI 15.31 kg/m²     "

## 2023-01-17 NOTE — ED PROVIDER NOTES
"ED Provider Note    CHIEF COMPLAINT  Chief Complaint   Patient presents with    Pregnancy     Approx 10 weeks pregnant. Has had an ultrasound    Drug Withdrawal     Pt is prescribed methadone, pt has been vomiting for the last days. Pt feels like she's going through withdrawal    Irregular Heart Beat     States she feels like her HR is racing then \"drops\"         HPI/ROS:    LIMITATION TO HISTORY   Select: : None      Madyson Ocampo is a 22 y.o. female who presents with chief complaint of palpitations.  Patient is approximately 10 weeks pregnant she states that she has been having an irregular heartbeat and feeling some palpitations.  She states that she frequently wakes up at night and is feels as though her heart is beating out of her chest very tachycardic she has measured her heart rate up to 130 in these events.  She reports that she occasionally becomes somewhat dizzy and somewhat nauseous with this.  She states that she has a history of hyperemesis gravidarum and then she has had some nausea and vomiting with this pregnancy however far less than with her previous.  She has occasional tightness in her chest not currently experiencing pain right now no fevers no chills no cough no sore throat no abdominal pain no problems with urination or bowel movements no other acute complaints at this time.    PAST MEDICAL HISTORY   Narcotic dependence    SURGICAL HISTORY  patient denies any surgical history    FAMILY HISTORY  No family history on file.    SOCIAL HISTORY  Social History     Tobacco Use    Smoking status: Never    Smokeless tobacco: Never   Vaping Use    Vaping Use: Never used   Substance and Sexual Activity    Alcohol use: No    Drug use: No    Sexual activity: Yes     Partners: Male       CURRENT MEDICATIONS  Home Medications       Reviewed by Annia Allen R.N. (Registered Nurse) on 01/16/23 at 1704  Med List Status: Not Addressed     Medication Last Dose Status   BD PEN NEEDLE HARSH 2ND GEN  " "Active   ferrous sulfate 325 (65 Fe) MG tablet  Active   ibuprofen (MOTRIN) 600 MG Tab  Active   lamoTRIgine (LAMICTAL) 25 MG Tab  Active   Lancets Ultra Thin 30G Misc  Active   LATUDA 40 MG Tab  Active   ondansetron (ZOFRAN ODT) 4 MG TABLET DISPERSIBLE  Active                    ALLERGIES  No Known Allergies    PHYSICAL EXAM  VITAL SIGNS: /69   Pulse 79   Temp (!) 38.1 °C (100.5 °F) (Temporal)   Resp 18   Ht 1.6 m (5' 3\")   Wt 39.2 kg (86 lb 6.7 oz)   LMP 09/21/2022 (Approximate)   SpO2 98%   BMI 15.31 kg/m²    Constitutional: Alert and oriented x 3, .  HENT: Normocephalic, Atraumatic, Bilateral external ears normal. Nose normal.   Eyes: Pupils are equal and reactive. Conjunctiva normal, non-icteric.   Heart: Regular rate and rythm,   Lungs: No respiratory distress  GI: Soft nontender nondistended   Skin: Warm, Dry, No erythema, No rash.   Neurologic: Cranial nerves III through XII grossly intact no sensory deficit no cerebellar dysfunction.   Psychiatric: Appropriate affect for situation     DIAGNOSTIC STUDIES / PROCEDURES    Results for orders placed or performed during the hospital encounter of 01/16/23   CBC WITH DIFFERENTIAL   Result Value Ref Range    WBC 12.7 (H) 4.8 - 10.8 K/uL    RBC 4.05 (L) 4.20 - 5.40 M/uL    Hemoglobin 11.3 (L) 12.0 - 16.0 g/dL    Hematocrit 33.8 (L) 37.0 - 47.0 %    MCV 83.5 81.4 - 97.8 fL    MCH 27.9 27.0 - 33.0 pg    MCHC 33.4 (L) 33.6 - 35.0 g/dL    RDW 41.1 35.9 - 50.0 fL    Platelet Count 169 164 - 446 K/uL    MPV 10.0 9.0 - 12.9 fL    Neutrophils-Polys 77.90 (H) 44.00 - 72.00 %    Lymphocytes 17.30 (L) 22.00 - 41.00 %    Monocytes 4.20 0.00 - 13.40 %    Eosinophils 0.20 0.00 - 6.90 %    Basophils 0.20 0.00 - 1.80 %    Immature Granulocytes 0.20 0.00 - 0.90 %    Nucleated RBC 0.00 /100 WBC    Neutrophils (Absolute) 9.91 (H) 2.00 - 7.15 K/uL    Lymphs (Absolute) 2.20 1.00 - 4.80 K/uL    Monos (Absolute) 0.54 0.00 - 0.85 K/uL    Eos (Absolute) 0.03 0.00 - 0.51 K/uL    " Baso (Absolute) 0.02 0.00 - 0.12 K/uL    Immature Granulocytes (abs) 0.03 0.00 - 0.11 K/uL    NRBC (Absolute) 0.00 K/uL   COMP METABOLIC PANEL   Result Value Ref Range    Sodium 143 135 - 145 mmol/L    Potassium 3.6 3.6 - 5.5 mmol/L    Chloride 108 96 - 112 mmol/L    Co2 23 20 - 33 mmol/L    Anion Gap 12.0 7.0 - 16.0    Glucose 98 65 - 99 mg/dL    Bun 10 8 - 22 mg/dL    Creatinine 0.42 (L) 0.50 - 1.40 mg/dL    Calcium 9.6 8.5 - 10.5 mg/dL    AST(SGOT) 17 12 - 45 U/L    ALT(SGPT) 11 2 - 50 U/L    Alkaline Phosphatase 58 30 - 99 U/L    Total Bilirubin 0.4 0.1 - 1.5 mg/dL    Albumin 4.3 3.2 - 4.9 g/dL    Total Protein 6.6 6.0 - 8.2 g/dL    Globulin 2.3 1.9 - 3.5 g/dL    A-G Ratio 1.9 g/dL   TROPONIN   Result Value Ref Range    Troponin T <6 6 - 19 ng/L   URINALYSIS,CULTURE IF INDICATED    Specimen: Urine, Clean Catch   Result Value Ref Range    Color DK Yellow     Character Clear     Specific Gravity 1.032 <1.035    Ph 5.5 5.0 - 8.0    Glucose Negative Negative mg/dL    Ketones Trace (A) Negative mg/dL    Protein 30 (A) Negative mg/dL    Bilirubin Negative Negative    Urobilinogen, Urine 0.2 Negative    Nitrite Negative Negative    Leukocyte Esterase Negative Negative    Occult Blood Negative Negative    Micro Urine Req Microscopic    URINE MICROSCOPIC (W/UA)   Result Value Ref Range    WBC 0-2 /hpf    RBC 0-2 /hpf    Bacteria Rare (A) None /hpf    Epithelial Cells Few /hpf    Mucous Threads Many /hpf    Hyaline Cast 0-2 /lpf   CORRECTED CALCIUM   Result Value Ref Range    Correct Calcium 9.4 8.5 - 10.5 mg/dL   ESTIMATED GFR   Result Value Ref Range    GFR (CKD-EPI) 141 >60 mL/min/1.73 m 2   EKG   Result Value Ref Range    Report       [facility] Emergency Dept.    Test Date:  2023  Pt Name:    LEATHA HARGROVE               Department: ER  MRN:        [id number]                      Room:  Gender:     Female                       Technician: [id number]  :        2000                    Requested By:ER TRIAGE PROTOCOL  Order #:    [id number]                    Reading MD:    Measurements  Intervals                                Axis  Rate:       71                           P:          48  MS:         144                          QRS:        54  QRSD:       80                           T:          45  QT:         420  QTc:        457    Interpretive Statements  Sinus rhythm  No previous ECG available for comparison           RADIOLOGY  DX-CHEST-2 VIEWS   Final Result      No active disease.            COURSE & MEDICAL DECISION MAKING    ED Observation Status? No; Patient does not meet criteria for ED Observation.     INITIAL ASSESSMENT AND PLAN  Care Narrative: Pleasant 22-year-old female 10 weeks pregnant who has been having some palpitations and also waking up with some tachycardia at night.  She has no tachycardia here her blood pressure is unremarkable physical exam is reassuring.  Patient had troponin is negative all electrolytes are unremarkable she is otherwise feeling well at this time.  I discussed follow-up with primary care and possibly cardiology as an outpatient for further evaluation and treatment possible Holter versus Zio patch.  Patient is understanding that should she experience any pain in her chest any shortness of breath any other acute symptom changes or concerns that she is to return here at any time otherwise she is given appropriate discharge precautions and discharged in stable and improved condition.  Of note patient did have a slight leukocytosis with slight left shift and was also found to have some bacteria in her urine therefore she is placed on Keflex given first dose here.  Discharged in stable and improved condition.    ADDITIONAL PROBLEM LIST AND DISPOSITION    Problem #1: Palpitations, follow-up with cardiology  Problem #2: Asymptomatic bacteriuria of pregnancy, cephalexin    I have discussed management of the patient with the following physicians and VEL's:       Discussion of management with other Q or appropriate source(s):      Escalation of care considered, and ultimately not performed:acute inpatient care management, however at this time, the patient is most appropriate for outpatient management    Barriers to care at this time, including but not limited to: Patient does not have established PCP.     Decision tools and prescription drugs considered including, but not limited to: Antibiotics   .    [facility]  [address], [address]  [facility] [id number] [phone]        [facility], Emergency Dept  [address] [address] [phone]    in 12-24 hours if symptoms persist, immediately If symptoms worsen, or if you develop any other symptoms or concerns    [address] [address]  [phone]    for establishment of primary care, for blood pressure management          FINAL DIAGNOSIS  1. Palpitations Active   2. Asymptomatic bacteriuria of pregnancy Active              Electronically signed by: Harrison Edmondson M.D., 1/16/2023 5:57 PM

## 2023-01-17 NOTE — DISCHARGE PLANNING
Medical Social Work    Pt placed on High Risk Pregnancy List due to methadone use and being seen in ER for drug withdrawal; pt reports being 10 weeks pregnant.

## 2023-01-17 NOTE — ED NOTES
Pt provided d/c teaching. IV d/c'd. Vitals assessed and WNL. Pt ambulates to exit with steady gait and all belongings.

## 2023-01-25 ENCOUNTER — TELEPHONE (OUTPATIENT)
Dept: HEALTH INFORMATION MANAGEMENT | Facility: OTHER | Age: 23
End: 2023-01-25
Payer: MEDICAID

## 2023-02-28 ENCOUNTER — TELEPHONE (OUTPATIENT)
Dept: CARDIOLOGY | Facility: MEDICAL CENTER | Age: 23
End: 2023-02-28
Payer: MEDICAID

## 2023-02-28 DIAGNOSIS — R00.2 PALPITATIONS: ICD-10-CM

## 2023-02-28 NOTE — TELEPHONE ENCOUNTER
Noted below:  Received: Yesterday  BALA Esparza R.N.  To ADD RN, 2/28/2023 pt is not established with our office, could you follow up per workflow?  See BN's recommendations below, thank you!      ----- Message -----  From: Maxwell Shane M.D.   Sent: 2/27/2023   5:23 PM PST   To: BALA Esparza     She was supposed to see me for palpitations during pregnancy.  She was about 20 minutes late to her appointment and did not want to wait until I had an opening later in the day.     Can you call her and see if she would be willing to have us send her a 2-week cardiac monitor?  This is most likely what we are going to do anyway, so she can do a monitor and then follow-up after that has been completed if she likes.  She is also pregnant and in general pregnant patients should be seen in our women's health clinic by Dr. Post whenever possible and not in our general clinic.  Can you see if she can be scheduled with Dr. Post.      Attempted to call patient, no answer and unable to leave voicemail as voicemailbox not set up.

## 2023-03-02 NOTE — TELEPHONE ENCOUNTER
"Warm transfer from Coordinator. Patient saw missed call and called back. Patient states she was unable to stay and wait for appointment on 2/27/23 due to having her \"fiance waiting for her\". Patient agreeable to heart monitor and seeing Dr. Post at the [facility]. Cardiac monitor order placed.     [facility] Scheduling Team: Please contact patient and schedule for cardiac monitor and with Dr. Post in [facility] per BN recommendations below. Thank you!      "

## 2023-03-03 ENCOUNTER — TELEPHONE (OUTPATIENT)
Dept: CARDIOLOGY | Facility: MEDICAL CENTER | Age: 23
End: 2023-03-03
Payer: MEDICAID

## 2023-03-03 NOTE — TELEPHONE ENCOUNTER
Tried to call pt to schedule heart monitor and appointment with DA in [facility] per BN. Pt didn't answer and VM not set up so unable to LVM. Sent [email or web address] message. /cg [id number]

## 2023-03-04 ENCOUNTER — HOSPITAL ENCOUNTER (EMERGENCY)
Facility: MEDICAL CENTER | Age: 23
End: 2023-03-04
Attending: EMERGENCY MEDICINE
Payer: MEDICAID

## 2023-03-04 ENCOUNTER — APPOINTMENT (OUTPATIENT)
Dept: RADIOLOGY | Facility: MEDICAL CENTER | Age: 23
End: 2023-03-04
Attending: EMERGENCY MEDICINE
Payer: MEDICAID

## 2023-03-04 VITALS
OXYGEN SATURATION: 98 % | RESPIRATION RATE: 18 BRPM | DIASTOLIC BLOOD PRESSURE: 56 MMHG | BODY MASS INDEX: 15.31 KG/M2 | TEMPERATURE: 98.9 F | SYSTOLIC BLOOD PRESSURE: 108 MMHG | HEIGHT: 63 IN | HEART RATE: 90 BPM

## 2023-03-04 DIAGNOSIS — T71.193A ASSAULT BY MANUAL STRANGULATION: ICD-10-CM

## 2023-03-04 DIAGNOSIS — M54.2 NECK PAIN: ICD-10-CM
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
## 2023-03-04 LAB — EKG IMPRESSION: NORMAL

## 2023-03-04 PROCEDURE — 93880 EXTRACRANIAL BILAT STUDY: CPT

## 2023-03-04 PROCEDURE — 93005 ELECTROCARDIOGRAM TRACING: CPT | Performed by: EMERGENCY MEDICINE

## 2023-03-04 PROCEDURE — 76815 OB US LIMITED FETUS(S): CPT

## 2023-03-04 PROCEDURE — 93005 ELECTROCARDIOGRAM TRACING: CPT

## 2023-03-04 PROCEDURE — 99284 EMERGENCY DEPT VISIT MOD MDM: CPT

## 2023-03-04 ASSESSMENT — LIFESTYLE VARIABLES
HOW MANY TIMES IN THE PAST YEAR HAVE YOU HAD 5 OR MORE DRINKS IN A DAY: 0
TOTAL SCORE: 0
ON A TYPICAL DAY WHEN YOU DRINK ALCOHOL HOW MANY DRINKS DO YOU HAVE: 0
HAVE PEOPLE ANNOYED YOU BY CRITICIZING YOUR DRINKING: NO
TOTAL SCORE: 0
CONSUMPTION TOTAL: NEGATIVE
HAVE YOU EVER FELT YOU SHOULD CUT DOWN ON YOUR DRINKING: NO
DO YOU DRINK ALCOHOL: NO
EVER FELT BAD OR GUILTY ABOUT YOUR DRINKING: NO
EVER HAD A DRINK FIRST THING IN THE MORNING TO STEADY YOUR NERVES TO GET RID OF A HANGOVER: NO
AVERAGE NUMBER OF DAYS PER WEEK YOU HAVE A DRINK CONTAINING ALCOHOL: 0
TOTAL SCORE: 0

## 2023-03-05 NOTE — ED NOTES
Pt given food and drink. Pt updated on eta of US.     Rounded on pt. Pt in NAD.  Pt reports no further needs at this time.   Call bell within reach.
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
## 2023-03-05 NOTE — ED NOTES
Report received from Ger JEREZ.    Rounded on pt. Pt in NAD.  Pt reports no further needs at this time.   Call bell within reach.

## 2023-03-05 NOTE — DISCHARGE INSTRUCTIONS
Please follow-up with your primary care physician, or the women Center for complete recheck.  Return to the emergency department if you develop any new or worsening symptoms including headaches, neck pain, nausea or vomiting, if you cannot tolerate food or fluids, or any further concerns.
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
## 2023-03-05 NOTE — ED NOTES
Patient is stable for discharge at this time, anticipatory guidance provided, close follow-up is encouraged, and ED return instructions have been detailed. Patient and family are both agreeable to the disposition and plan and discharged home in ambulatory state and in good condition.     Pt discharged to father, pt reports she has safe place to stay.

## 2023-03-09 ENCOUNTER — NON-PROVIDER VISIT (OUTPATIENT)
Dept: CARDIOLOGY | Facility: MEDICAL CENTER | Age: 23
End: 2023-03-09
Attending: INTERNAL MEDICINE
Payer: MEDICAID

## 2023-03-09 DIAGNOSIS — R00.2 PALPITATIONS: ICD-10-CM

## 2023-03-09 PROCEDURE — 99999 PR NO CHARGE: CPT | Performed by: INTERNAL MEDICINE

## 2023-03-09 NOTE — PROGRESS NOTES
Closing encounter no charge, no response from patient. No VM set up 06/14/23    Moises sending pt monitor [employer] for re hook 05/03/23    No answer, no message available 04/17/23    S/w pt states she took monitor to [employer] on 03/25/23, no tracking has been scanned with [employer], Moises suggests re hooking pt. Tried calling pt back no answer no message availability 04/06/23.      Patient enrolled in the 14 day ePatch Holter monitoring program, per Maxwell Shane M.D.  In clinic hook up, monitor S/N [id number].  >Pending EOS.

## 2023-04-10 ENCOUNTER — NON-PROVIDER VISIT (OUTPATIENT)
Dept: OCCUPATIONAL MEDICINE | Facility: CLINIC | Age: 23
End: 2023-04-10

## 2023-04-10 DIAGNOSIS — Z11.1 ENCOUNTER FOR PPD TEST: Primary | ICD-10-CM

## 2023-04-10 PROCEDURE — 86580 TB INTRADERMAL TEST: CPT | Performed by: NURSE PRACTITIONER

## 2023-05-03 ENCOUNTER — OFFICE VISIT (OUTPATIENT)
Dept: URGENT CARE | Facility: CLINIC | Age: 23
End: 2023-05-03
Payer: MEDICAID

## 2023-05-03 VITALS
HEIGHT: 63 IN | RESPIRATION RATE: 16 BRPM | TEMPERATURE: 98.1 F | SYSTOLIC BLOOD PRESSURE: 100 MMHG | HEART RATE: 91 BPM | BODY MASS INDEX: 18.59 KG/M2 | WEIGHT: 104.9 LBS | OXYGEN SATURATION: 95 % | DIASTOLIC BLOOD PRESSURE: 70 MMHG

## 2023-05-03 DIAGNOSIS — R68.89 FLU-LIKE SYMPTOMS: ICD-10-CM

## 2023-05-03 DIAGNOSIS — J06.9 VIRAL URI: ICD-10-CM

## 2023-05-03 LAB
FLUAV RNA SPEC QL NAA+PROBE: NEGATIVE
FLUBV RNA SPEC QL NAA+PROBE: NEGATIVE
RSV RNA SPEC QL NAA+PROBE: NEGATIVE
SARS-COV-2 RNA RESP QL NAA+PROBE: NEGATIVE

## 2023-05-03 PROCEDURE — 99213 OFFICE O/P EST LOW 20 MIN: CPT | Performed by: NURSE PRACTITIONER

## 2023-05-03 PROCEDURE — 0241U POCT CEPHEID COV-2, FLU A/B, RSV - PCR: CPT | Performed by: NURSE PRACTITIONER

## 2023-05-03 ASSESSMENT — FIBROSIS 4 INDEX: FIB4 SCORE: 0.67

## 2023-05-03 NOTE — LETTER
May 3, 2023       Patient: Madyson Ocampo   YOB: 2000   Date of Visit: 5/3/2023         To Whom It May Concern:    In my medical opinion, I recommend that Madyson Ocampo be excused from work from 5/2/2023 through 5/4/2023 due to illness.     If you have any questions or concerns, please don't hesitate to call [phone]          Sincerely,          Jammie Rivera A.P.R.N.  Electronically Signed

## 2023-05-06 ASSESSMENT — ENCOUNTER SYMPTOMS
FEVER: 1
HEADACHES: 1
MUSCULOSKELETAL NEGATIVE: 1
GASTROINTESTINAL NEGATIVE: 1
CARDIOVASCULAR NEGATIVE: 1
COUGH: 1
CHILLS: 1
EYES NEGATIVE: 1

## 2023-05-06 NOTE — PROGRESS NOTES
"Subjective:   Madyson Ocampo is a 22 y.o. female who presents for Headache (24 weeks pregnant and having body aches, headache, fever. The patient thinks she may have the flu or Covid. ), Body Aches, and Fever      Fever   This is a new problem. Episode onset: 2 days - Patient is 24 weeks gestation. The problem occurs intermittently. The problem has been unchanged. The maximum temperature noted was 102 to 102.9 F. The temperature was taken using a tympanic thermometer. Associated symptoms include congestion, coughing, headaches and muscle aches. She has tried acetaminophen for the symptoms. The treatment provided mild relief.   Risk factors: sick contacts      Review of Systems   Constitutional:  Positive for chills, fever and malaise/fatigue.   HENT:  Positive for congestion.    Eyes: Negative.    Respiratory:  Positive for cough.    Cardiovascular: Negative.    Gastrointestinal: Negative.    Genitourinary: Negative.    Musculoskeletal: Negative.    Skin: Negative.    Neurological:  Positive for headaches.     Medications, Allergies, and current problem list reviewed today in Epic.     Objective:     /70 (BP Location: Left arm, Patient Position: Sitting, BP Cuff Size: Adult)   Pulse 91   Temp 36.7 °C (98.1 °F) (Temporal)   Resp 16   Ht 1.6 m (5' 3\")   Wt 47.6 kg (104 lb 14.4 oz)   SpO2 95%     Physical Exam  Vitals reviewed.   Constitutional:       General: She is not in acute distress.     Appearance: Normal appearance. She is ill-appearing.   HENT:      Head: Normocephalic and atraumatic.      Right Ear: Tympanic membrane, ear canal and external ear normal.      Left Ear: Tympanic membrane, ear canal and external ear normal.      Nose: Congestion present.      Mouth/Throat:      Mouth: Mucous membranes are moist.      Pharynx: Oropharynx is clear.   Eyes:      Extraocular Movements: Extraocular movements intact.      Conjunctiva/sclera: Conjunctivae normal.      Pupils: Pupils are equal, round, " and reactive to light.   Cardiovascular:      Rate and Rhythm: Normal rate and regular rhythm.      Pulses: Normal pulses.      Heart sounds: Normal heart sounds.   Pulmonary:      Effort: Pulmonary effort is normal.      Breath sounds: Normal breath sounds.   Abdominal:      General: Abdomen is flat.      Palpations: Abdomen is soft.   Musculoskeletal:         General: Normal range of motion.      Cervical back: Normal range of motion and neck supple.   Skin:     General: Skin is warm and dry.      Capillary Refill: Capillary refill takes less than 2 seconds.   Neurological:      General: No focal deficit present.      Mental Status: She is alert.   Psychiatric:         Mood and Affect: Mood normal.         Behavior: Behavior normal.       Lab Results/POC Test Results   Results for orders placed or performed in visit on 05/03/23   POCT CoV-2, Flu A/B, RSV by PCR   Result Value Ref Range    SARS-CoV-2 by PCR Negative Negative, Invalid    Influenza virus A RNA Negative Negative, Invalid    Influenza virus B, PCR Negative Negative, Invalid    RSV, PCR Negative Negative, Invalid           Assessment/Plan:     Diagnosis and associated orders:     1. Viral URI        2. Flu-like symptoms  POCT CoV-2, Flu A/B, RSV by PCR         Comments/MDM:     Advised patient symptoms are viral in etiology, recommend supportive care. Increased fluids and rest.  Recommend over-the-counter cold and flu medications that are safe for pregnancy such as Tylenol for symptomatic relief and fevers.  Discussed use of nedi-pot, humidifier, and Flonase nasal spray as well.  Discussed good hand hygiene and ways to decrease spread of disease.  Follow-up with PCP return for reevaluation if symptoms persist/worsen.  Patient offered discharge instructions regarding viral illness.  The patient demonstrated a good understanding and agreed with the treatment plan.            Differential diagnosis, natural history, supportive care, and indications for  immediate follow-up discussed.    Advised the patient to follow-up with the primary care physician for recheck, reevaluation, and consideration of further management.    Please note that this dictation was created using voice recognition software. I have made a reasonable attempt to correct obvious errors, but I expect that there are errors of grammar and possibly content that I did not discover before finalizing the note.    This note was electronically signed by FIDEL Saenz

## 2023-06-22 DIAGNOSIS — D50.9 IRON DEFICIENCY ANEMIA, UNSPECIFIED IRON DEFICIENCY ANEMIA TYPE: ICD-10-CM

## 2023-06-22 RX ORDER — EPINEPHRINE 1 MG/ML(1)
0.5 AMPUL (ML) INJECTION PRN
Status: CANCELLED | OUTPATIENT
Start: 2023-06-23

## 2023-06-22 RX ORDER — METHYLPREDNISOLONE SODIUM SUCCINATE 125 MG/2ML
125 INJECTION, POWDER, LYOPHILIZED, FOR SOLUTION INTRAMUSCULAR; INTRAVENOUS PRN
Status: CANCELLED | OUTPATIENT
Start: 2023-06-23

## 2023-06-22 RX ORDER — SODIUM CHLORIDE 9 MG/ML
INJECTION, SOLUTION INTRAVENOUS CONTINUOUS
Status: CANCELLED | OUTPATIENT
Start: 2023-06-23

## 2023-06-22 RX ORDER — DIPHENHYDRAMINE HYDROCHLORIDE 50 MG/ML
50 INJECTION INTRAMUSCULAR; INTRAVENOUS PRN
Status: CANCELLED | OUTPATIENT
Start: 2023-06-23

## 2023-07-14 ENCOUNTER — TELEPHONE (OUTPATIENT)
Dept: ONCOLOGY | Facility: MEDICAL CENTER | Age: 23
End: 2023-07-14
Payer: MEDICAID

## 2023-07-30 ENCOUNTER — OUTPATIENT INFUSION SERVICES (OUTPATIENT)
Dept: ONCOLOGY | Facility: MEDICAL CENTER | Age: 23
End: 2023-07-30
Attending: OBSTETRICS & GYNECOLOGY
Payer: MEDICAID

## 2023-07-30 VITALS
SYSTOLIC BLOOD PRESSURE: 97 MMHG | DIASTOLIC BLOOD PRESSURE: 56 MMHG | HEIGHT: 63 IN | TEMPERATURE: 98 F | BODY MASS INDEX: 19.26 KG/M2 | RESPIRATION RATE: 18 BRPM | OXYGEN SATURATION: 97 % | WEIGHT: 108.69 LBS | HEART RATE: 89 BPM

## 2023-07-30 DIAGNOSIS — D50.9 IRON DEFICIENCY ANEMIA, UNSPECIFIED IRON DEFICIENCY ANEMIA TYPE: ICD-10-CM

## 2023-07-30 LAB
FERRITIN SERPL-MCNC: 8.1 NG/ML (ref 10–291)
IRON SATN MFR SERPL: 6 % (ref 15–55)
IRON SERPL-MCNC: 33 UG/DL (ref 40–170)
TIBC SERPL-MCNC: 516 UG/DL (ref 250–450)
UIBC SERPL-MCNC: 483 UG/DL (ref 110–370)

## 2023-07-30 PROCEDURE — 83540 ASSAY OF IRON: CPT

## 2023-07-30 PROCEDURE — 700111 HCHG RX REV CODE 636 W/ 250 OVERRIDE (IP): Mod: JZ,UD | Performed by: OBSTETRICS & GYNECOLOGY

## 2023-07-30 PROCEDURE — 82728 ASSAY OF FERRITIN: CPT

## 2023-07-30 PROCEDURE — 96365 THER/PROPH/DIAG IV INF INIT: CPT

## 2023-07-30 PROCEDURE — 83550 IRON BINDING TEST: CPT

## 2023-07-30 PROCEDURE — 700105 HCHG RX REV CODE 258: Mod: JZ,UD | Performed by: OBSTETRICS & GYNECOLOGY

## 2023-07-30 RX ORDER — METHYLPREDNISOLONE SODIUM SUCCINATE 125 MG/2ML
125 INJECTION, POWDER, LYOPHILIZED, FOR SOLUTION INTRAMUSCULAR; INTRAVENOUS PRN
Status: CANCELLED | OUTPATIENT
Start: 2023-07-30

## 2023-07-30 RX ORDER — EPINEPHRINE 1 MG/ML(1)
0.5 AMPUL (ML) INJECTION PRN
Status: CANCELLED | OUTPATIENT
Start: 2023-07-30

## 2023-07-30 RX ORDER — DIPHENHYDRAMINE HYDROCHLORIDE 50 MG/ML
50 INJECTION INTRAMUSCULAR; INTRAVENOUS PRN
Status: CANCELLED | OUTPATIENT
Start: 2023-07-30

## 2023-07-30 RX ORDER — SODIUM CHLORIDE 9 MG/ML
INJECTION, SOLUTION INTRAVENOUS CONTINUOUS
Status: CANCELLED | OUTPATIENT
Start: 2023-07-30

## 2023-07-30 RX ADMIN — SODIUM CHLORIDE 1000 MG: 9 INJECTION, SOLUTION INTRAVENOUS at 13:10

## 2023-07-30 ASSESSMENT — FIBROSIS 4 INDEX: FIB4 SCORE: 0.7

## 2023-07-30 NOTE — PROGRESS NOTES
Madyson arrives to [facility] for first dose Iron Dextran for iron deficiency anemia in pregnancy. Patient c/o fatigue, hyperemesis, and craving of ice. Medication education and plan of care discussed. Potential side effects and adverse reactions discussed. Questions and concerns answered prior to start of treatment. 24g PIV placed to RAC, which flushes easily and has brisk blood return. Iron studies and ferritin level drawn as ordered. Infed then infused per pharmacy protocol without adverse s/s. Infusion started at 75 ml/h for 5 mins, stopped for 10 mins, and resumed at 333 ml/h for the remainder of the treatment. Patient tolerated treatment well. PIV flushed and removed with tip intact. Patient to follow up with provider and has no future [facility] appointments. Discharged home to self care in no apparent distress.

## 2024-04-30 ENCOUNTER — OFFICE VISIT (OUTPATIENT)
Dept: URGENT CARE | Facility: CLINIC | Age: 24
End: 2024-04-30
Payer: MEDICAID

## 2024-04-30 VITALS
TEMPERATURE: 97.8 F | OXYGEN SATURATION: 98 % | HEIGHT: 63 IN | BODY MASS INDEX: 15.34 KG/M2 | SYSTOLIC BLOOD PRESSURE: 96 MMHG | WEIGHT: 86.6 LBS | HEART RATE: 98 BPM | DIASTOLIC BLOOD PRESSURE: 70 MMHG | RESPIRATION RATE: 16 BRPM

## 2024-04-30 DIAGNOSIS — K08.89 PAIN, DENTAL: ICD-10-CM

## 2024-04-30 DIAGNOSIS — K04.7 DENTAL INFECTION: ICD-10-CM

## 2024-04-30 RX ORDER — IBUPROFEN 800 MG/1
800 TABLET ORAL EVERY 8 HOURS PRN
Qty: 30 TABLET | Refills: 0 | Status: SHIPPED | OUTPATIENT
Start: 2024-04-30

## 2024-04-30 RX ORDER — METHADONE HYDROCHLORIDE 5 MG/1
65 TABLET ORAL
COMMUNITY
Start: 2023-07-13

## 2024-04-30 RX ORDER — AMOXICILLIN 500 MG/1
500 CAPSULE ORAL 3 TIMES DAILY
Qty: 21 CAPSULE | Refills: 0 | Status: SHIPPED | OUTPATIENT
Start: 2024-04-30 | End: 2024-05-07

## 2024-04-30 ASSESSMENT — FIBROSIS 4 INDEX: FIB4 SCORE: 0.7

## 2024-04-30 ASSESSMENT — ENCOUNTER SYMPTOMS
SINUS PRESSURE: 1
FEVER: 1

## 2024-05-01 NOTE — PROGRESS NOTES
Subjective:   Madyson Ocampo is a 23 y.o. female who presents today with   Chief Complaint   Patient presents with    Dental Pain     [place] tooth on right side pssibly infected; painful. Patient states that pain for the last 3 months that comes and goes. States that last night she was having fevers. One side of her mouth she has an abscess. States a needle stabbing pain.      Dental Pain   This is a new problem. The current episode started more than 1 month ago. The problem occurs constantly. The problem has been gradually worsening. The pain is mild. Associated symptoms include facial pain, a fever (subjective) and sinus pressure. Pertinent negatives include no difficulty swallowing, oral bleeding or thermal sensitivity. She has tried NSAIDs for the symptoms. The treatment provided mild relief.     Patient states she had some leftover 800 mg ibuprofen at home and those have been the only thing that does seem to be helpful.  Patient states she is on a waiting list to see a dentist.  Patient states she has mouth crowding secondary to multiple teeth including 2 wisdom teeth coming in in the lower right side and 1 in the upper right side.    PMH:  has no past medical history on file.  MEDS:   Current Outpatient Medications:     methadone (DOLOPHINE) 5 MG Tab, 65 mg., Disp: , Rfl:     ibuprofen (MOTRIN) 800 MG Tab, Take 1 Tablet by mouth every 8 hours as needed for Moderate Pain., Disp: 30 Tablet, Rfl: 0    amoxicillin (AMOXIL) 500 MG Cap, Take 1 Capsule by mouth 3 times a day for 7 days., Disp: 21 Capsule, Rfl: 0    ondansetron (ZOFRAN ODT) 4 MG TABLET DISPERSIBLE, Dissolve 1 Tablet by mouth every 6 hours as needed for Nausea., Disp: 10 Tablet, Rfl: 0  ALLERGIES: No Known Allergies  SURGHX: No past surgical history on file.  SOCHX:  reports that she has never smoked. She has never used smokeless tobacco. She reports that she does not drink alcohol and does not use drugs.  FH: Reviewed with patient, not  "pertinent to this visit.     Review of Systems   Constitutional:  Positive for fever (subjective).   HENT:  Positive for sinus pressure.       Objective:   BP 96/70   Pulse 98   Temp 36.6 °C (97.8 °F) (Temporal)   Resp 16   Ht 1.6 m (5' 3\")   Wt 39.3 kg (86 lb 9.6 oz)   SpO2 98%   BMI 15.34 kg/m²   Physical Exam  Vitals and nursing note reviewed.   Constitutional:       General: She is not in acute distress.     Appearance: Normal appearance. She is well-developed. She is not ill-appearing or toxic-appearing.   HENT:      Head: Normocephalic and atraumatic.      Right Ear: Hearing normal.      Left Ear: Hearing normal.      Mouth/Throat:      Mouth: Mucous membranes are moist.      Dentition: Abnormal dentition. Gingival swelling and dental caries present.      Pharynx: Uvula midline. No uvula swelling.      Tonsils: No tonsillar abscesses.        Comments: Tooth pushing through the right lower gumline posteriorly and significant dental carry with possible exposed nerve to the third tooth from the back on the right upper side.  Cardiovascular:      Rate and Rhythm: Normal rate.   Pulmonary:      Effort: Pulmonary effort is normal.   Musculoskeletal:      Comments: Normal movement in all 4 extremities   Skin:     General: Skin is warm and dry.   Neurological:      Mental Status: She is alert.      Coordination: Coordination normal.   Psychiatric:         Mood and Affect: Mood normal.       Assessment/Plan:   Assessment    1. Pain, dental  - ibuprofen (MOTRIN) 800 MG Tab; Take 1 Tablet by mouth every 8 hours as needed for Moderate Pain.  Dispense: 30 Tablet; Refill: 0    2. Dental infection  - amoxicillin (AMOXIL) 500 MG Cap; Take 1 Capsule by mouth 3 times a day for 7 days.  Dispense: 21 Capsule; Refill: 0    Other orders  - methadone (DOLOPHINE) 5 MG Tab; 65 mg.    Symptoms and presentation appear consistent with dental infection to the right upper gumline and recommend treating with antibiotics at this time. "  Will also send over ibuprofen and patient is understanding to only take these sparingly as prescribed once or twice daily.  Recommend following up with dentist.    Differential diagnosis, natural history, supportive care, and indications for immediate follow-up discussed.   Patient given instructions and understanding of medications and treatment.    If not improving in 3-5 days, F/U with PCP or return to UC if symptoms worsen.    Patient agreeable to plan.      Please note that this dictation was created using voice recognition software. I have made every reasonable attempt to correct obvious errors, but I expect that there are errors of grammar and possibly content that I did not discover before finalizing the note.    Ritchie Adkins PA-C

## 2025-02-05 ENCOUNTER — APPOINTMENT (OUTPATIENT)
Dept: URGENT CARE | Facility: CLINIC | Age: 25
End: 2025-02-05
Payer: MEDICAID